# Patient Record
Sex: MALE | Race: WHITE | NOT HISPANIC OR LATINO | Employment: FULL TIME | URBAN - METROPOLITAN AREA
[De-identification: names, ages, dates, MRNs, and addresses within clinical notes are randomized per-mention and may not be internally consistent; named-entity substitution may affect disease eponyms.]

---

## 2017-07-18 ENCOUNTER — OFFICE VISIT - HEALTHEAST (OUTPATIENT)
Dept: FAMILY MEDICINE | Facility: CLINIC | Age: 37
End: 2017-07-18

## 2017-07-18 DIAGNOSIS — F41.1 GENERALIZED ANXIETY DISORDER: ICD-10-CM

## 2017-07-18 ASSESSMENT — MIFFLIN-ST. JEOR: SCORE: 1768.16

## 2017-07-25 ENCOUNTER — COMMUNICATION - HEALTHEAST (OUTPATIENT)
Dept: FAMILY MEDICINE | Facility: CLINIC | Age: 37
End: 2017-07-25

## 2017-08-31 ENCOUNTER — COMMUNICATION - HEALTHEAST (OUTPATIENT)
Dept: FAMILY MEDICINE | Facility: CLINIC | Age: 37
End: 2017-08-31

## 2017-12-07 ENCOUNTER — COMMUNICATION - HEALTHEAST (OUTPATIENT)
Dept: FAMILY MEDICINE | Facility: CLINIC | Age: 37
End: 2017-12-07

## 2018-06-11 ENCOUNTER — COMMUNICATION - HEALTHEAST (OUTPATIENT)
Dept: FAMILY MEDICINE | Facility: CLINIC | Age: 38
End: 2018-06-11

## 2018-06-11 DIAGNOSIS — F41.1 GENERALIZED ANXIETY DISORDER: ICD-10-CM

## 2018-06-14 ENCOUNTER — COMMUNICATION - HEALTHEAST (OUTPATIENT)
Dept: FAMILY MEDICINE | Facility: CLINIC | Age: 38
End: 2018-06-14

## 2018-06-18 ENCOUNTER — COMMUNICATION - HEALTHEAST (OUTPATIENT)
Dept: FAMILY MEDICINE | Facility: CLINIC | Age: 38
End: 2018-06-18

## 2018-06-18 DIAGNOSIS — F41.1 GENERALIZED ANXIETY DISORDER: ICD-10-CM

## 2018-09-09 ENCOUNTER — COMMUNICATION - HEALTHEAST (OUTPATIENT)
Dept: FAMILY MEDICINE | Facility: CLINIC | Age: 38
End: 2018-09-09

## 2018-09-10 ENCOUNTER — COMMUNICATION - HEALTHEAST (OUTPATIENT)
Dept: FAMILY MEDICINE | Facility: CLINIC | Age: 38
End: 2018-09-10

## 2018-09-10 DIAGNOSIS — F41.1 GENERALIZED ANXIETY DISORDER: ICD-10-CM

## 2018-09-26 ENCOUNTER — OFFICE VISIT - HEALTHEAST (OUTPATIENT)
Dept: FAMILY MEDICINE | Facility: CLINIC | Age: 38
End: 2018-09-26

## 2018-09-26 DIAGNOSIS — Z00.00 HEALTHCARE MAINTENANCE: ICD-10-CM

## 2018-09-26 DIAGNOSIS — F41.1 GENERALIZED ANXIETY DISORDER: ICD-10-CM

## 2018-09-26 ASSESSMENT — MIFFLIN-ST. JEOR: SCORE: 1870.78

## 2019-02-08 ENCOUNTER — COMMUNICATION - HEALTHEAST (OUTPATIENT)
Dept: FAMILY MEDICINE | Facility: CLINIC | Age: 39
End: 2019-02-08

## 2019-02-08 DIAGNOSIS — F41.1 GENERALIZED ANXIETY DISORDER: ICD-10-CM

## 2019-02-12 ENCOUNTER — COMMUNICATION - HEALTHEAST (OUTPATIENT)
Dept: FAMILY MEDICINE | Facility: CLINIC | Age: 39
End: 2019-02-12

## 2019-02-12 DIAGNOSIS — F41.1 GENERALIZED ANXIETY DISORDER: ICD-10-CM

## 2019-09-07 ENCOUNTER — HOSPITAL ENCOUNTER (EMERGENCY)
Facility: CLINIC | Age: 39
Discharge: HOME | End: 2019-09-07
Attending: EMERGENCY MEDICINE | Admitting: EMERGENCY MEDICINE
Payer: COMMERCIAL

## 2019-09-07 VITALS
TEMPERATURE: 97.8 F | OXYGEN SATURATION: 97 % | RESPIRATION RATE: 15 BRPM | DIASTOLIC BLOOD PRESSURE: 107 MMHG | SYSTOLIC BLOOD PRESSURE: 144 MMHG | HEART RATE: 83 BPM

## 2019-09-07 DIAGNOSIS — T63.443A BEE STING REACTION, ASSAULT, INITIAL ENCOUNTER: ICD-10-CM
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
## 2019-09-07 DIAGNOSIS — S90.112A CONTUSION OF LEFT GREAT TOE WITHOUT DAMAGE TO NAIL, INITIAL ENCOUNTER: ICD-10-CM

## 2019-09-07 PROCEDURE — 96375 TX/PRO/DX INJ NEW DRUG ADDON: CPT | Performed by: EMERGENCY MEDICINE

## 2019-09-07 PROCEDURE — 99285 EMERGENCY DEPT VISIT HI MDM: CPT | Mod: Z6 | Performed by: EMERGENCY MEDICINE

## 2019-09-07 PROCEDURE — 99285 EMERGENCY DEPT VISIT HI MDM: CPT | Mod: 25 | Performed by: EMERGENCY MEDICINE

## 2019-09-07 PROCEDURE — 25000131 ZZH RX MED GY IP 250 OP 636 PS 637: Performed by: EMERGENCY MEDICINE

## 2019-09-07 PROCEDURE — 96361 HYDRATE IV INFUSION ADD-ON: CPT | Performed by: EMERGENCY MEDICINE

## 2019-09-07 PROCEDURE — 96376 TX/PRO/DX INJ SAME DRUG ADON: CPT | Performed by: EMERGENCY MEDICINE

## 2019-09-07 PROCEDURE — 25000128 H RX IP 250 OP 636: Performed by: EMERGENCY MEDICINE

## 2019-09-07 PROCEDURE — 96374 THER/PROPH/DIAG INJ IV PUSH: CPT | Performed by: EMERGENCY MEDICINE

## 2019-09-07 RX ORDER — LORAZEPAM 2 MG/ML
0.5 INJECTION INTRAMUSCULAR ONCE
Status: COMPLETED | OUTPATIENT
Start: 2019-09-07 | End: 2019-09-07

## 2019-09-07 RX ORDER — SERTRALINE HYDROCHLORIDE 100 MG/1
200 TABLET, FILM COATED ORAL EVERY MORNING
COMMUNITY
Start: 2019-02-12 | End: 2023-06-14

## 2019-09-07 RX ORDER — PREDNISONE 20 MG/1
TABLET ORAL
Qty: 10 TABLET | Refills: 0 | Status: SHIPPED | OUTPATIENT
Start: 2019-09-07 | End: 2023-06-14

## 2019-09-07 RX ORDER — HYDROMORPHONE HYDROCHLORIDE 1 MG/ML
0.5 INJECTION, SOLUTION INTRAMUSCULAR; INTRAVENOUS; SUBCUTANEOUS ONCE
Status: COMPLETED | OUTPATIENT
Start: 2019-09-07 | End: 2019-09-07

## 2019-09-07 RX ORDER — DIPHENHYDRAMINE HYDROCHLORIDE 50 MG/ML
50 INJECTION INTRAMUSCULAR; INTRAVENOUS ONCE
Status: COMPLETED | OUTPATIENT
Start: 2019-09-07 | End: 2019-09-07

## 2019-09-07 RX ORDER — PREDNISONE 20 MG/1
60 TABLET ORAL ONCE
Status: COMPLETED | OUTPATIENT
Start: 2019-09-07 | End: 2019-09-07

## 2019-09-07 RX ORDER — KETOROLAC TROMETHAMINE 30 MG/ML
30 INJECTION, SOLUTION INTRAMUSCULAR; INTRAVENOUS ONCE
Status: COMPLETED | OUTPATIENT
Start: 2019-09-07 | End: 2019-09-07

## 2019-09-07 RX ORDER — OXYCODONE AND ACETAMINOPHEN 5; 325 MG/1; MG/1
1-2 TABLET ORAL EVERY 4 HOURS PRN
Qty: 4 TABLET | Refills: 0 | Status: SHIPPED | OUTPATIENT
Start: 2019-09-07 | End: 2023-06-14

## 2019-09-07 RX ADMIN — DIPHENHYDRAMINE HYDROCHLORIDE 50 MG: 50 INJECTION, SOLUTION INTRAMUSCULAR; INTRAVENOUS at 15:42

## 2019-09-07 RX ADMIN — HYDROMORPHONE HYDROCHLORIDE 0.5 MG: 1 INJECTION, SOLUTION INTRAMUSCULAR; INTRAVENOUS; SUBCUTANEOUS at 16:20

## 2019-09-07 RX ADMIN — KETOROLAC TROMETHAMINE 30 MG: 30 INJECTION, SOLUTION INTRAMUSCULAR at 15:42

## 2019-09-07 RX ADMIN — LORAZEPAM 0.5 MG: 2 INJECTION INTRAMUSCULAR; INTRAVENOUS at 15:54

## 2019-09-07 RX ADMIN — HYDROMORPHONE HYDROCHLORIDE 0.5 MG: 1 INJECTION, SOLUTION INTRAMUSCULAR; INTRAVENOUS; SUBCUTANEOUS at 17:52

## 2019-09-07 RX ADMIN — PREDNISONE 60 MG: 20 TABLET ORAL at 17:51

## 2019-09-07 RX ADMIN — SODIUM CHLORIDE 1000 ML: 9 INJECTION, SOLUTION INTRAVENOUS at 15:40

## 2019-09-07 NOTE — ED NOTES
Dilaudid given as pain is still intolerable, especially on the top of his head and his R foot. Pt now holding ice pack on head. Pt denies feeling of throat swelling or swallowing difficulties, is drinking water without problems.

## 2019-09-07 NOTE — ED AVS SNAPSHOT
[facility] Emergency Department  [address] [address]  Phone:  [phone]  Fax:  [phone]                                    Moody Davila   MRN: [id number]    Department:  [facility] Emergency Department   Date of Visit:  9/7/2019           After Visit Summary Signature Page    I have received my discharge instructions, and my questions have been answered. I have discussed any challenges I see with this plan with the nurse or doctor.    ..........................................................................................................................................  Patient/Patient Representative Signature      ..........................................................................................................................................  Patient Representative Print Name and Relationship to Patient    ..................................................               ................................................  Date                                   Time    ..........................................................................................................................................  Reviewed by Signature/Title    ...................................................              ..............................................  Date                                               Time          22EPIC Rev 08/18

## 2019-09-07 NOTE — ED NOTES
Pt was mowing the grass and apparently struck a nest of ground hornets - was stung by several and fell down trying to run from the insects. Patient denies any shortness of breath - c/o pain to feet, behind his knee and both of his hands. No swelling noted to face or lips - oral mucosa appears normal - both ears are redenned. MD in room
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
## 2019-09-08 NOTE — ED NOTES
Wounds dressed on foot - bacitracin and bandaid - tolerated well - denies any other needs or complaints at this time. Ready for discharge.

## 2019-09-08 NOTE — DISCHARGE INSTRUCTIONS
Return if symptoms worsen or new symptoms develop.  Follow-up with primary care later this week take prednisone as directed for the next 5 days take Benadryl 50 mg p.o. every 6 hours for the next 24 and then as needed.  If increased pain any swelling shortness of breath or other symptoms present please return for further evaluation and care.

## 2020-02-03 ENCOUNTER — COMMUNICATION - HEALTHEAST (OUTPATIENT)
Dept: FAMILY MEDICINE | Facility: CLINIC | Age: 40
End: 2020-02-03

## 2020-02-03 DIAGNOSIS — F41.1 GENERALIZED ANXIETY DISORDER: ICD-10-CM

## 2020-02-07 ENCOUNTER — OFFICE VISIT - HEALTHEAST (OUTPATIENT)
Dept: FAMILY MEDICINE | Facility: CLINIC | Age: 40
End: 2020-02-07

## 2020-02-07 DIAGNOSIS — F41.1 GENERALIZED ANXIETY DISORDER: ICD-10-CM

## 2020-02-07 DIAGNOSIS — Z28.21 INFLUENZA VACCINATION DECLINED: ICD-10-CM

## 2020-02-07 ASSESSMENT — MIFFLIN-ST. JEOR: SCORE: 1935.71

## 2020-02-07 ASSESSMENT — PATIENT HEALTH QUESTIONNAIRE - PHQ9: SUM OF ALL RESPONSES TO PHQ QUESTIONS 1-9: 3

## 2020-02-10 ASSESSMENT — ANXIETY QUESTIONNAIRES
3. WORRYING TOO MUCH ABOUT DIFFERENT THINGS: SEVERAL DAYS
7. FEELING AFRAID AS IF SOMETHING AWFUL MIGHT HAPPEN: SEVERAL DAYS
4. TROUBLE RELAXING: NOT AT ALL
GAD7 TOTAL SCORE: 4
2. NOT BEING ABLE TO STOP OR CONTROL WORRYING: NOT AT ALL
1. FEELING NERVOUS, ANXIOUS, OR ON EDGE: SEVERAL DAYS
5. BEING SO RESTLESS THAT IT IS HARD TO SIT STILL: NOT AT ALL
6. BECOMING EASILY ANNOYED OR IRRITABLE: SEVERAL DAYS

## 2020-03-11 ENCOUNTER — HEALTH MAINTENANCE LETTER (OUTPATIENT)
Age: 40
End: 2020-03-11

## 2021-01-03 ENCOUNTER — HEALTH MAINTENANCE LETTER (OUTPATIENT)
Age: 41
End: 2021-01-03

## 2021-01-15 ENCOUNTER — OFFICE VISIT - HEALTHEAST (OUTPATIENT)
Dept: FAMILY MEDICINE | Facility: CLINIC | Age: 41
End: 2021-01-15

## 2021-01-23 ENCOUNTER — HOSPITAL ENCOUNTER (EMERGENCY)
Facility: CLINIC | Age: 41
Discharge: HOME | End: 2021-01-23
Attending: NURSE PRACTITIONER | Admitting: NURSE PRACTITIONER
Payer: COMMERCIAL

## 2021-01-23 VITALS
HEART RATE: 105 BPM | SYSTOLIC BLOOD PRESSURE: 155 MMHG | TEMPERATURE: 99 F | DIASTOLIC BLOOD PRESSURE: 79 MMHG | OXYGEN SATURATION: 97 % | WEIGHT: 210 LBS | BODY MASS INDEX: 28.44 KG/M2 | RESPIRATION RATE: 18 BRPM | HEIGHT: 72 IN

## 2021-01-23 DIAGNOSIS — S01.511A LIP LACERATION: ICD-10-CM

## 2021-01-23 DIAGNOSIS — T74.91XA DOMESTIC ABUSE OF ADULT: ICD-10-CM

## 2021-01-23 PROCEDURE — 250N000013 HC RX MED GY IP 250 OP 250 PS 637: Performed by: NURSE PRACTITIONER

## 2021-01-23 PROCEDURE — 99284 EMERGENCY DEPT VISIT MOD MDM: CPT | Performed by: NURSE PRACTITIONER

## 2021-01-23 PROCEDURE — 99283 EMERGENCY DEPT VISIT LOW MDM: CPT | Performed by: NURSE PRACTITIONER

## 2021-01-23 RX ORDER — IBUPROFEN 400 MG/1
800 TABLET, FILM COATED ORAL ONCE
Status: COMPLETED | OUTPATIENT
Start: 2021-01-23 | End: 2021-01-23

## 2021-01-23 RX ADMIN — IBUPROFEN 800 MG: 400 TABLET, FILM COATED ORAL at 22:35

## 2021-01-23 ASSESSMENT — ENCOUNTER SYMPTOMS
NEUROLOGICAL NEGATIVE: 1
MUSCULOSKELETAL NEGATIVE: 1
RESPIRATORY NEGATIVE: 1
CARDIOVASCULAR NEGATIVE: 1
CONSTITUTIONAL NEGATIVE: 1
WOUND: 1

## 2021-01-23 ASSESSMENT — MIFFLIN-ST. JEOR: SCORE: 1905.55
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
## 2021-02-12 ENCOUNTER — COMMUNICATION - HEALTHEAST (OUTPATIENT)
Dept: FAMILY MEDICINE | Facility: CLINIC | Age: 41
End: 2021-02-12

## 2021-02-12 DIAGNOSIS — F41.1 GENERALIZED ANXIETY DISORDER: ICD-10-CM

## 2021-04-25 ENCOUNTER — HEALTH MAINTENANCE LETTER (OUTPATIENT)
Age: 41
End: 2021-04-25

## 2021-05-24 ENCOUNTER — COMMUNICATION - HEALTHEAST (OUTPATIENT)
Dept: FAMILY MEDICINE | Facility: CLINIC | Age: 41
End: 2021-05-24

## 2021-05-24 DIAGNOSIS — F41.1 GENERALIZED ANXIETY DISORDER: ICD-10-CM

## 2021-05-26 ASSESSMENT — PATIENT HEALTH QUESTIONNAIRE - PHQ9: SUM OF ALL RESPONSES TO PHQ QUESTIONS 1-9: 3

## 2021-05-28 ASSESSMENT — ANXIETY QUESTIONNAIRES: GAD7 TOTAL SCORE: 4

## 2021-05-31 VITALS — BODY MASS INDEX: 24.73 KG/M2 | WEIGHT: 182.56 LBS | HEIGHT: 72 IN

## 2021-06-01 ENCOUNTER — RECORDS - HEALTHEAST (OUTPATIENT)
Dept: ADMINISTRATIVE | Facility: CLINIC | Age: 41
End: 2021-06-01

## 2021-06-01 VITALS — HEIGHT: 72 IN | BODY MASS INDEX: 27.79 KG/M2 | WEIGHT: 205.19 LBS

## 2021-06-04 VITALS
HEIGHT: 72 IN | RESPIRATION RATE: 16 BRPM | BODY MASS INDEX: 29.73 KG/M2 | HEART RATE: 80 BPM | SYSTOLIC BLOOD PRESSURE: 110 MMHG | DIASTOLIC BLOOD PRESSURE: 80 MMHG | WEIGHT: 219.5 LBS

## 2021-06-05 NOTE — PROGRESS NOTES
"[facility] [email or web address]] Note    Name: Moody Davila  : 1980   MRN: [id number]    Moody Davila is a 39 y.o. male presenting to discuss the following:     CC:   Chief Complaint   Patient presents with     Medication Education Visit       HPI:  Here today for refill of Zoloft. Mood has been well controlled. No side effects from medication. Would like to continue.  PHQ9: 3/7  GAD7:     HEALTH MAINTENANCE:   - flu shot: declines today    ROS:   See HPI above.    PMH:   Patient Active Problem List   Diagnosis     Generalized Anxiety Disorder       No past medical history on file.    PSH:   Past Surgical History:   Procedure Laterality Date     WISDOM TOOTH EXTRACTION       MEDICATIONS:   Current Outpatient Medications on File Prior to Visit   Medication Sig Dispense Refill     sertraline (ZOLOFT) 100 MG tablet Take 2 tablets (200mg total) by mouth once daily. 180 tablet 0     No current facility-administered medications on file prior to visit.      ALLERGIES:  No Known Allergies    SOCIAL HISTORY:   Social History     Tobacco Use     Smoking status: Never Smoker     Smokeless tobacco: Never Used   Substance Use Topics     Alcohol use: Not on file     Drug use: Not on file     PHYSICAL EXAM:   /80   Pulse 80   Resp 16   Ht 5' 11.5\" (1.816 m)   Wt 219 lb 8 oz (99.6 kg)   BMI 30.19 kg/m     GENERAL: Moody is a pleasant, well appearing male in no acute distress.   HEART: Regular rate and rhythm, no murmurs.   LUNGS: Clear to auscultation bilaterally, unlabored.  PSYCH: Mood is good, normal affect, appropriately groomed.    ASSESSMENT & PLAN:   Moody Davila is a 39 y.o. male presenting today for medication refill.    1. Generalized anxiety disorder  - sertraline (ZOLOFT) 100 MG tablet; Take 2 tablets (200mg total) by mouth once daily.  Dispense: 180 tablet; Refill: 3     Symptoms well controlled. Would like to continue current dose. Refill sent to pharmacy.    2. Influenza vaccine " declined    RTC: 6 months - annual physical exam with PCP / sooner as needed    Alondra Flynn, DO

## 2021-06-05 NOTE — TELEPHONE ENCOUNTER
Refill NOT Given  Refill NOT Given> 15 months since last office visit  Refill given per Policy, patient informed they are overdue for Office Visit   OV 9/26/18    Daniela Hernandez, [facility] Triage/Med Refill 2/3/2020    Requested Prescriptions   Pending Prescriptions Disp Refills     sertraline (ZOLOFT) 100 MG tablet 180 tablet 1     Sig: Take 2 tablets (200mg total) by mouth once daily.       SSRI Refill Protocol  Failed - 2/3/2020 11:19 AM        Failed - PCP or prescribing provider visit in last year     Last office visit with prescriber/PCP: 9/26/2018 Jadyn Louis MD OR same dept: Visit date not found OR same specialty: 9/26/2018 Jadyn Louis MD  Last physical: Visit date not found Last MTM visit: Visit date not found   Next visit within 3 mo: Visit date not found  Next physical within 3 mo: Visit date not found  Prescriber OR PCP: Jadyn Luois MD  Last diagnosis associated with med order: 1. Generalized anxiety disorder  - sertraline (ZOLOFT) 100 MG tablet; Take 2 tablets (200mg total) by mouth once daily.  Dispense: 180 tablet; Refill: 1    If protocol passes may refill for 12 months if within 3 months of last provider visit (or a total of 15 months).

## 2021-06-12 NOTE — PROGRESS NOTES
Assessment/Plan:    Moody Davila is a 37 y.o. male presenting for:    1. Generalized anxiety disorder  He did not like how he felt while he was on citalopram to we will start him on Zoloft today as his brother is on that and is doing well.  He will take Klonopin as needed.  Discussed that this will likely make him drowsy and that he should use it very sparingly.  I did offer him a referral to therapy however he is not interested at this point. - sertraline (ZOLOFT) 50 MG tablet; Take 1 tablet (50 mg total) by mouth daily.  Dispense: 30 tablet; Refill: 2  - clonazePAM (KLONOPIN) 0.5 MG tablet; Take 1 tablet (0.5 mg total) by mouth 2 (two) times a day as needed for anxiety.  Dispense: 30 tablet; Refill: 0        There are no discontinued medications.    30 minutes spent with this patient over half of which spent in face-to-face counseling and coordination of care    Chief Complaint:  Chief Complaint   Patient presents with     Anxiety     Depression     Est. Care       Subjective:   Moody Davila  Is a very pleasant 37-year-old gentleman presenting to the clinic today for concerns over anxiety.  Patient is a past medical history significant for anxiety and was on citalopram at one point.  He states the medication did help his symptoms however he felt a bit groggy on it.  He has been dealing with things fairly well for the last few years but it on his wife's recommendation he is coming in today.  He feels a bit more irritable recently.  He is sleeping well.  He is hopeful to get back on a daily medication but prefers not to go back on the citalopram.    He believes that his twin brother is on Zoloft and is doing very well with that.  He feels as though he has a good support system.  No suicidal or homicidal ideations.      12 point review of systems completed and negative except for what has been described above    History   Smoking Status     Never Smoker   Smokeless Tobacco     Never Used       Current  "Outpatient Prescriptions   Medication Sig     clonazePAM (KLONOPIN) 0.5 MG tablet Take 1 tablet (0.5 mg total) by mouth 2 (two) times a day as needed for anxiety.     sertraline (ZOLOFT) 50 MG tablet Take 1 tablet (50 mg total) by mouth daily.         Objective:  Vitals:    07/18/17 1458   BP: 120/72   Pulse: 76   Resp: 16   Temp: 98.4  F (36.9  C)   TempSrc: Oral   Weight: 182 lb 9 oz (82.8 kg)   Height: 5' 11.5\" (1.816 m)       Vital signs reviewed and stable  General: No acute distress  Psych: Appropriate affect  HEENT: moist mucous membranes  Cardiovascular: regular rate and rhythm with no murmur  Pulmonary: clear to auscultation bilaterally with no wheeze  Abdomen: soft, non tender, non distended with normo-active bowel sounds  Extremities: warm and well perfused with no edema  Skin: warm and dry with no rash         This note has been dictated and transcribed using voice recognition software.   Any errors in transcription are unintentional and inherent to the software.  "

## 2021-06-15 NOTE — TELEPHONE ENCOUNTER
Refill Approved    Rx renewed per Medication Renewal Policy. Medication was last renewed on 2/7/20, last OV 2/7/20.    Kristal Garvin, [facility] Triage/Med Refill 2/13/2021     Requested Prescriptions   Pending Prescriptions Disp Refills     sertraline (ZOLOFT) 100 MG tablet 180 tablet 3     Sig: Take 2 tablets (200mg total) by mouth once daily.       SSRI Refill Protocol  Passed - 2/12/2021  1:35 PM        Passed - PCP or prescribing provider visit in last year     Last office visit with prescriber/PCP: 9/26/2018 Jadyn Louis MD OR same dept: Visit date not found OR same specialty: 2/7/2020 Alondra Flynn DO  Last physical: Visit date not found Last MTM visit: Visit date not found   Next visit within 3 mo: Visit date not found  Next physical within 3 mo: Visit date not found  Prescriber OR PCP: Jadyn Louis MD  Last diagnosis associated with med order: 1. Generalized anxiety disorder  - sertraline (ZOLOFT) 100 MG tablet; Take 2 tablets (200mg total) by mouth once daily.  Dispense: 180 tablet; Refill: 3    If protocol passes may refill for 12 months if within 3 months of last provider visit (or a total of 15 months).

## 2021-06-20 NOTE — PROGRESS NOTES
Assessment/Plan:    Moody Davila is a 38 y.o. male presenting for:    1. Generalized anxiety disorder  Patient is doing very well with the 150 mg of Zoloft daily.  We will continue the medication.  Next year he will schedule for complete physical and come fasting for some baseline lab work.  - sertraline (ZOLOFT) 100 MG tablet; TAKE 1 AND 1/2 TABLETS(150 MG) BY MOUTH DAILY  Dispense: 135 tablet; Refill: 3    2. Healthcare maintenance  - Influenza, Seasonal,Quad Inj, 36+ MOS  - Tdap vaccine,  6yo or older,  IM  The following high BMI interventions were performed this visit: encouragement to exercise and weight monitoring        Medications Discontinued During This Encounter   Medication Reason     sertraline (ZOLOFT) 100 MG tablet      sertraline (ZOLOFT) 100 MG tablet      sertraline (ZOLOFT) 100 MG tablet            Chief Complaint:  Chief Complaint   Patient presents with     Medication Education Visit     Med check       Subjective:   Moody Davila is a very pleasant 38-year-old gentleman with a past medical history significant for anxiety presenting to the clinic today to follow-up his anxiety.  Patient currently takes Zoloft 150 mg daily.  He feels like he is doing well with the medication.  He has not been on it now for about 1 year with some mild increases throughout that time.  He feels that he is on a steady dose.  He feels as though he is able to better function in his life with the medication.  He is not having any panic attacks.    He does admit that he is gained some weight recently and is planning on getting back to the gym.  He has stopped going to the gym for the last 6 months.  He is a 6-year-old and a 3-year-old at home.    Otherwise, his throat has been slightly sore recently.  His daughter has been a bit ill to.  He has not had any fevers.    12 point review of systems completed and negative except for what has been described above    History   Smoking Status     Never Smoker   Smokeless  "Tobacco     Never Used       Current Outpatient Prescriptions   Medication Sig     sertraline (ZOLOFT) 100 MG tablet TAKE 1 AND 1/2 TABLETS(150 MG) BY MOUTH DAILY         Objective:  Vitals:    09/26/18 1336   BP: 120/82   Pulse: 72   Resp: 16   Temp: 98  F (36.7  C)   TempSrc: Oral   Weight: 205 lb 3 oz (93.1 kg)   Height: 5' 11.5\" (1.816 m)       Vital signs reviewed and stable  General: No acute distress  Psych: Appropriate affect  HEENT: moist mucous membranes, pupils equal, round, reactive to light and accomodation, posterior oropharynx clear of erythema or exudate, tympanic membranes are pearly grey bilaterally  Lymph: no cervical or supraclavicular lymphadenopathy  Cardiovascular: regular rate and rhythm with no murmur  Pulmonary: clear to auscultation bilaterally with no wheeze  Abdomen: soft, non tender, non distended with normo-active bowel sounds  Extremities: warm and well perfused with no edema  Skin: warm and dry with no rash         This note has been dictated and transcribed using voice recognition software.   Any errors in transcription are unintentional and inherent to the software.  "

## 2021-06-23 NOTE — TELEPHONE ENCOUNTER
Dr. Louis-  See pt's OneWire message and reply via OneWire.  Pt currently taking:    sertraline (ZOLOFT) 100 MG tablet 135 tablet 3 9/26/2018     Sig: TAKE 1 AND 1/2 TABLETS(150 MG) BY MOUTH DAILY      Last OV 9-26-18.

## 2021-06-24 NOTE — TELEPHONE ENCOUNTER
Pharmacy requesting a new prescription for sertraline. Current rx has two sets of instructions, both 150mg and 200mg. Per Dodonation message from 2/8/19, pt increased to 200mg daily.  Rx queued for MD approval.

## 2021-06-24 NOTE — TELEPHONE ENCOUNTER
Medication Question or Clarification  Who is calling: Pharmacy: [facility]  What medication are you calling about?: Sertraline  What dose do you take?: 100 mg  How often are you taking the medication?: dosing unclear  Who prescribed the medication?: Jadyn Louis MD   What is your question/concern?: please update prescription with current dosing instruction. Prescription submitted in 2.11.2019 has two different sets of directions.  Pharmacy: [facility]  Okay to leave a detailed message?: Yes  Site CMT - Please call the pharmacy to obtain any additional needed information.    sertraline (ZOLOFT) 100 MG tablet 180 tablet 1 2/11/2019  No   Sig - Route: Take 2 tablets (200 mg total) by mouth daily. TAKE 1 AND 1/2 TABLETS(150 MG) BY MOUTH DAILY - Oral   Sent to pharmacy as: sertraline (ZOLOFT) 100 MG tablet   E-Prescribing Status: Receipt confirmed by pharmacy (2/11/2019  9:27 AM CST)

## 2021-06-25 NOTE — TELEPHONE ENCOUNTER
Refill Approved    Rx renewed per Medication Renewal Policy. Medication was last renewed on 2/13/21.    Juan Hinojosa, [facility] Triage/Med Refill 5/25/2021     Requested Prescriptions   Pending Prescriptions Disp Refills     sertraline (ZOLOFT) 100 MG tablet 180 tablet 0     Sig: Take 2 tablets (200mg total) by mouth once daily.       SSRI Refill Protocol  Passed - 5/24/2021  3:24 PM        Passed - PCP or prescribing provider visit in last year     Last office visit with prescriber/PCP: 9/26/2018 Jadyn Louis MD OR same dept: Visit date not found OR same specialty: 2/7/2020 Alondra Flynn DO  Last physical: Visit date not found Last MTM visit: Visit date not found   Next visit within 3 mo: Visit date not found  Next physical within 3 mo: Visit date not found  Prescriber OR PCP: Jadyn Louis MD  Last diagnosis associated with med order: 1. Generalized anxiety disorder  - sertraline (ZOLOFT) 100 MG tablet; Take 2 tablets (200mg total) by mouth once daily.  Dispense: 180 tablet; Refill: 0    If protocol passes may refill for 12 months if within 3 months of last provider visit (or a total of 15 months).

## 2021-06-28 NOTE — TELEPHONE ENCOUNTER
Requested Prescriptions     Pending Prescriptions Disp Refills     sertraline (ZOLOFT) 100 MG tablet 180 tablet 3     Sig: Take 2 tablets (200mg total) by mouth once daily.   Last Office Visit: 2/7/2020  Last Refill:  10/30/2020  CSA:  No  Date of Last Labs:  11/8/2011   Insomnia

## 2021-07-03 NOTE — ADDENDUM NOTE
Addendum Note by Jadyn Louis MD at 8/31/2017  2:55 PM     Author: Jadyn Louis MD Service: -- Author Type: Physician    Filed: 8/31/2017  2:55 PM Encounter Date: 8/31/2017 Status: Signed    : Jadyn Louis MD (Physician)    Addended by: JADYN LOUIS on: 8/31/2017 02:55 PM        Modules accepted: Orders

## 2021-07-03 NOTE — ADDENDUM NOTE
Addendum Note by Jadyn Louis MD at 12/8/2017  3:16 PM     Author: Jadyn Louis MD Service: -- Author Type: Physician    Filed: 12/8/2017  3:16 PM Encounter Date: 8/31/2017 Status: Signed    : Jadyn Louis MD (Physician)    Addended by: JADYN LOUIS on: 12/8/2017 03:16 PM        Modules accepted: Orders

## 2021-10-10 ENCOUNTER — HEALTH MAINTENANCE LETTER (OUTPATIENT)
Age: 41
End: 2021-10-10

## 2022-05-22 ENCOUNTER — HEALTH MAINTENANCE LETTER (OUTPATIENT)
Age: 42
End: 2022-05-22

## 2022-09-18 ENCOUNTER — HEALTH MAINTENANCE LETTER (OUTPATIENT)
Age: 42
End: 2022-09-18

## 2023-06-04 ENCOUNTER — HEALTH MAINTENANCE LETTER (OUTPATIENT)
Age: 43
End: 2023-06-04

## 2023-06-14 ENCOUNTER — OFFICE VISIT (OUTPATIENT)
Dept: FAMILY MEDICINE | Facility: CLINIC | Age: 43
End: 2023-06-14
Payer: COMMERCIAL

## 2023-06-14 VITALS
WEIGHT: 212 LBS | BODY MASS INDEX: 28.71 KG/M2 | HEIGHT: 72 IN | OXYGEN SATURATION: 98 % | SYSTOLIC BLOOD PRESSURE: 126 MMHG | RESPIRATION RATE: 16 BRPM | HEART RATE: 93 BPM | TEMPERATURE: 97.5 F | DIASTOLIC BLOOD PRESSURE: 72 MMHG

## 2023-06-14 DIAGNOSIS — F41.1 GAD (GENERALIZED ANXIETY DISORDER): ICD-10-CM

## 2023-06-14 DIAGNOSIS — Z00.00 ROUTINE GENERAL MEDICAL EXAMINATION AT A HEALTH CARE FACILITY: Primary | ICD-10-CM

## 2023-06-14 DIAGNOSIS — E78.00 ELEVATED CHOLESTEROL: ICD-10-CM

## 2023-06-14 PROCEDURE — 99396 PREV VISIT EST AGE 40-64: CPT | Performed by: NURSE PRACTITIONER

## 2023-06-14 PROCEDURE — 99214 OFFICE O/P EST MOD 30 MIN: CPT | Mod: 25 | Performed by: NURSE PRACTITIONER

## 2023-06-14 RX ORDER — SERTRALINE HYDROCHLORIDE 100 MG/1
200 TABLET, FILM COATED ORAL EVERY MORNING
Qty: 180 TABLET | Refills: 3 | Status: SHIPPED | OUTPATIENT
Start: 2023-06-14 | End: 2024-05-20

## 2023-06-14 ASSESSMENT — ANXIETY QUESTIONNAIRES
7. FEELING AFRAID AS IF SOMETHING AWFUL MIGHT HAPPEN: NOT AT ALL
GAD7 TOTAL SCORE: 4
8. IF YOU CHECKED OFF ANY PROBLEMS, HOW DIFFICULT HAVE THESE MADE IT FOR YOU TO DO YOUR WORK, TAKE CARE OF THINGS AT HOME, OR GET ALONG WITH OTHER PEOPLE?: NOT DIFFICULT AT ALL
5. BEING SO RESTLESS THAT IT IS HARD TO SIT STILL: NOT AT ALL
3. WORRYING TOO MUCH ABOUT DIFFERENT THINGS: SEVERAL DAYS
7. FEELING AFRAID AS IF SOMETHING AWFUL MIGHT HAPPEN: NOT AT ALL
1. FEELING NERVOUS, ANXIOUS, OR ON EDGE: SEVERAL DAYS
4. TROUBLE RELAXING: NOT AT ALL
GAD7 TOTAL SCORE: 4
6. BECOMING EASILY ANNOYED OR IRRITABLE: SEVERAL DAYS
GAD7 TOTAL SCORE: 4
IF YOU CHECKED OFF ANY PROBLEMS ON THIS QUESTIONNAIRE, HOW DIFFICULT HAVE THESE PROBLEMS MADE IT FOR YOU TO DO YOUR WORK, TAKE CARE OF THINGS AT HOME, OR GET ALONG WITH OTHER PEOPLE: NOT DIFFICULT AT ALL
2. NOT BEING ABLE TO STOP OR CONTROL WORRYING: SEVERAL DAYS

## 2023-06-14 ASSESSMENT — PATIENT HEALTH QUESTIONNAIRE - PHQ9
10. IF YOU CHECKED OFF ANY PROBLEMS, HOW DIFFICULT HAVE THESE PROBLEMS MADE IT FOR YOU TO DO YOUR WORK, TAKE CARE OF THINGS AT HOME, OR GET ALONG WITH OTHER PEOPLE: NOT DIFFICULT AT ALL
SUM OF ALL RESPONSES TO PHQ QUESTIONS 1-9: 2
SUM OF ALL RESPONSES TO PHQ QUESTIONS 1-9: 2

## 2023-06-14 ASSESSMENT — PAIN SCALES - GENERAL: PAINLEVEL: NO PAIN (0)

## 2023-06-14 NOTE — PROGRESS NOTES
SUBJECTIVE:   CC: Moody is an 43 year old who presents for preventative health visit.       6/14/2023     3:13 PM   Additional Questions   Roomed by SMA Trace     Healthy Habits:     Getting at least 3 servings of Calcium per day:  Yes    Bi-annual eye exam:  Yes    Dental care twice a year:  NO    Sleep apnea or symptoms of sleep apnea:  None    Diet:  Regular (no restrictions)    Frequency of exercise:  None    Duration of exercise:  N/A    Taking medications regularly:  Yes    Barriers to taking medications:  None    Medication side effects:  None    PHQ-2 Total Score: 1    Additional concerns today:  No  History of Present Illness       Mental Health Follow-up:  Patient presents to follow-up on Anxiety.    Patient's anxiety since last visit has been:  Good  The patient is not having other symptoms associated with anxiety.  Any significant life events: No  Patient is not feeling anxious or having panic attacks.  Patient has no concerns about alcohol or drug use.    He eats 0-1 servings of fruits and vegetables daily.He consumes 1 sweetened beverage(s) daily.He exercises with enough effort to increase his heart rate 9 or less minutes per day.  He exercises with enough effort to increase his heart rate 3 or less days per week.   He is not taking prescribed medications regularly due to None.    Today's PHQ-9         PHQ-9 Total Score: 2    PHQ-9 Q9 Thoughts of better off dead/self-harm past 2 weeks :   Not at all    How difficult have these problems made it for you to do your work, take care of things at home, or get along with other people: Not difficult at all  Today's ML-7 Score: 4          Today's PHQ-2 Score:       6/14/2023     3:06 PM   PHQ-2 ( 1999 [employer])   Q1: Little interest or pleasure in doing things 1   Q2: Feeling down, depressed or hopeless 0   PHQ-2 Score 1   Q1: Little interest or pleasure in doing things Several days   Q2: Feeling down, depressed or hopeless Not at all   PHQ-2 Score 1        Have you ever done Advance Care Planning? (For example, a Health Directive, POLST, or a discussion with a medical provider or your loved ones about your wishes): No, advance care planning information given to patient to review.  Patient declined advance care planning discussion at this time.    Social History     Tobacco Use     Smoking status: Never     Smokeless tobacco: Never   Vaping Use     Vaping status: Never Used     Passive vaping exposure: Yes   Substance Use Topics     Alcohol use: Not on file              View : No data to display.                Last PSA: No results found for: PSA    Reviewed orders with patient. Reviewed health maintenance and updated orders accordingly - Yes  BP Readings from Last 3 Encounters:   06/14/23 126/72   01/23/21 (!) 155/79   02/07/20 110/80    Wt Readings from Last 3 Encounters:   06/14/23 96.2 kg (212 lb)   01/23/21 95.3 kg (210 lb)   02/07/20 99.6 kg (219 lb 8 oz)                  Patient Active Problem List   Diagnosis     Generalized Anxiety Disorder     Past Surgical History:   Procedure Laterality Date     WISDOM TOOTH EXTRACTION         Social History     Tobacco Use     Smoking status: Never     Smokeless tobacco: Never   Substance Use Topics     Alcohol use: Not on file     Family History   Problem Relation Age of Onset     No Known Problems Mother      No Known Problems Father      Mental Illness Brother         anxiety         Current Outpatient Medications   Medication Sig Dispense Refill     sertraline (ZOLOFT) 100 MG tablet Take 2 tablets (200 mg) by mouth every morning 180 tablet 3     No Known Allergies    Reviewed and updated as needed this visit by clinical staff   Tobacco  Allergies  Meds              Reviewed and updated as needed this visit by Provider                 History reviewed. No pertinent past medical history.   Past Surgical History:   Procedure Laterality Date     WISDOM TOOTH EXTRACTION         Review of Systems  CONSTITUTIONAL:  NEGATIVE for fever, chills, change in weight  INTEGUMENTARY/SKIN: NEGATIVE for worrisome rashes, moles or lesions  EYES: NEGATIVE for vision changes or irritation  ENT: NEGATIVE for ear, mouth and throat problems  RESP: NEGATIVE for significant cough or SOB  CV: NEGATIVE for chest pain, palpitations or peripheral edema  GI: NEGATIVE for nausea, abdominal pain, heartburn, or change in bowel habits   male: negative for dysuria, hematuria, decreased urinary stream, erectile dysfunction, urethral discharge  MUSCULOSKELETAL: NEGATIVE for significant arthralgias or myalgia  NEURO: NEGATIVE for weakness, dizziness or paresthesias  PSYCHIATRIC: ++ for anxiety     OBJECTIVE:   /72 (BP Location: Right arm, Patient Position: Sitting, Cuff Size: Adult Large)   Pulse 93   Temp 97.5  F (36.4  C) (Tympanic)   Resp 16   Ht 1.829 m (6')   Wt 96.2 kg (212 lb)   SpO2 98%   BMI 28.75 kg/m      Physical Exam  GENERAL: healthy, alert and no distress  EYES: Eyes grossly normal to inspection, PERRL and conjunctivae and sclerae normal  HENT: ear canals and TM's normal, nose and mouth without ulcers or lesions  NECK: no adenopathy, no asymmetry, masses, or scars and thyroid normal to palpation  RESP: lungs clear to auscultation - no rales, rhonchi or wheezes  CV: regular rate and rhythm, normal S1 S2, no S3 or S4, no murmur, click or rub, no peripheral edema and peripheral pulses strong  ABDOMEN: soft, nontender, no hepatosplenomegaly, no masses and bowel sounds normal  MS: no gross musculoskeletal defects noted, no edema  SKIN: no suspicious lesions or rashes  NEURO: Normal strength and tone, mentation intact and speech normal  PSYCH: mentation appears normal, affect normal/bright    Diagnostic Test Results:  Labs reviewed in Epic      ASSESSMENT/PLAN:   Moody was seen today for physical.    Diagnoses and all orders for this visit:    Routine general medical examination at a health care facility    ML (generalized anxiety  disorder)  Stable continue current meds  Labs for monitor medication order placed to RTC fasting  -     sertraline (ZOLOFT) 100 MG tablet; Take 2 tablets (200 mg) by mouth every morning  -     CBC with platelets and differential; Future  -     Comprehensive metabolic panel (BMP + Alb, Alk Phos, ALT, AST, Total. Bili, TP); Future  -     TSH with free T4 reflex; Future    Elevated cholesterol  Low fat diet and daily activity   Come in for fasting labs to monitor.   -     Lipid panel reflex to direct LDL Non-fasting; Future  -     CBC with platelets and differential; Future  -     Comprehensive metabolic panel (BMP + Alb, Alk Phos, ALT, AST, Total. Bili, TP); Future    Other orders  -     REVIEW OF HEALTH MAINTENANCE PROTOCOL ORDERS        Patient has been advised of split billing requirements and indicates understanding: Yes      COUNSELING:   Reviewed preventive health counseling, as reflected in patient instructions      BMI:   Estimated body mass index is 28.75 kg/m  as calculated from the following:    Height as of this encounter: 1.829 m (6').    Weight as of this encounter: 96.2 kg (212 lb).   Weight management plan: Discussed healthy diet and exercise guidelines      He reports that he has never smoked. He has never used smokeless tobacco.    Call or return to the clinic with any worsening of symptoms or no resolution. Patient/Parent verbalized understanding and is in agreement. Medication side effects reviewed.   Current Outpatient Medications   Medication Sig Dispense Refill     sertraline (ZOLOFT) 100 MG tablet Take 2 tablets (200 mg) by mouth every morning 180 tablet 3     Chart documentation with Dragon Voice recognition Software. Although reviewed after completion, some words and grammatical errors may remain.      BERNICE Tsai [facility]

## 2024-05-20 ENCOUNTER — OFFICE VISIT (OUTPATIENT)
Dept: FAMILY MEDICINE | Facility: CLINIC | Age: 44
End: 2024-05-20
Payer: COMMERCIAL

## 2024-05-20 VITALS
SYSTOLIC BLOOD PRESSURE: 120 MMHG | DIASTOLIC BLOOD PRESSURE: 80 MMHG | TEMPERATURE: 97.6 F | WEIGHT: 217 LBS | OXYGEN SATURATION: 98 % | BODY MASS INDEX: 29.39 KG/M2 | RESPIRATION RATE: 20 BRPM | HEIGHT: 72 IN | HEART RATE: 71 BPM

## 2024-05-20 DIAGNOSIS — F41.1 GAD (GENERALIZED ANXIETY DISORDER): ICD-10-CM

## 2024-05-20 PROCEDURE — 99213 OFFICE O/P EST LOW 20 MIN: CPT | Performed by: FAMILY MEDICINE

## 2024-05-20 RX ORDER — SERTRALINE HYDROCHLORIDE 100 MG/1
200 TABLET, FILM COATED ORAL EVERY MORNING
Qty: 180 TABLET | Refills: 3 | Status: SHIPPED | OUTPATIENT
Start: 2024-05-20

## 2024-05-20 ASSESSMENT — ANXIETY QUESTIONNAIRES
GAD7 TOTAL SCORE: 1
IF YOU CHECKED OFF ANY PROBLEMS ON THIS QUESTIONNAIRE, HOW DIFFICULT HAVE THESE PROBLEMS MADE IT FOR YOU TO DO YOUR WORK, TAKE CARE OF THINGS AT HOME, OR GET ALONG WITH OTHER PEOPLE: NOT DIFFICULT AT ALL
6. BECOMING EASILY ANNOYED OR IRRITABLE: NOT AT ALL
5. BEING SO RESTLESS THAT IT IS HARD TO SIT STILL: NOT AT ALL
3. WORRYING TOO MUCH ABOUT DIFFERENT THINGS: SEVERAL DAYS
7. FEELING AFRAID AS IF SOMETHING AWFUL MIGHT HAPPEN: NOT AT ALL
8. IF YOU CHECKED OFF ANY PROBLEMS, HOW DIFFICULT HAVE THESE MADE IT FOR YOU TO DO YOUR WORK, TAKE CARE OF THINGS AT HOME, OR GET ALONG WITH OTHER PEOPLE?: NOT DIFFICULT AT ALL
4. TROUBLE RELAXING: NOT AT ALL
GAD7 TOTAL SCORE: 1
7. FEELING AFRAID AS IF SOMETHING AWFUL MIGHT HAPPEN: NOT AT ALL
GAD7 TOTAL SCORE: 1
1. FEELING NERVOUS, ANXIOUS, OR ON EDGE: NOT AT ALL
2. NOT BEING ABLE TO STOP OR CONTROL WORRYING: NOT AT ALL

## 2024-05-20 ASSESSMENT — PAIN SCALES - GENERAL: PAINLEVEL: NO PAIN (0)

## 2024-05-20 NOTE — PROGRESS NOTES
Assessment & Plan     ML (generalized anxiety disorder)  Stable, no side effects, no other concerns  Would like to stay on current regimen, has tried cutting down before and felt anxiety returned  - sertraline (ZOLOFT) 100 MG tablet; Take 2 tablets (200 mg) by mouth every morning          BMI  Estimated body mass index is 29.43 kg/m  as calculated from the following:    Height as of this encounter: 1.829 m (6').    Weight as of this encounter: 98.4 kg (217 lb).             Carmenza Uriostegui is a 44 year old, presenting for the following health issues:  Anxiety        5/20/2024     4:14 PM   Additional Questions   Roomed by Inna ACEKRMAN CMA   Accompanied by Self     Anxiety   Zoloft 200mg every day    History of Present Illness       Mental Health Follow-up:  Patient presents to follow-up on Depression & Anxiety.Patient's depression since last visit has been:  No change  The patient is not having other symptoms associated with depression.  Patient's anxiety since last visit has been:  No change  The patient is not having other symptoms associated with anxiety.  Any significant life events: No  Patient is not feeling anxious or having panic attacks.  Patient has no concerns about alcohol or drug use.    He eats 0-1 servings of fruits and vegetables daily.He consumes 2 sweetened beverage(s) daily.He exercises with enough effort to increase his heart rate 30 to 60 minutes per day.  He exercises with enough effort to increase his heart rate 5 days per week.   He is taking medications regularly.       Social History     Tobacco Use    Smoking status: Never    Smokeless tobacco: Never   Vaping Use    Vaping status: Never Used         2/10/2020     8:00 AM 6/14/2023     3:06 PM 5/20/2024     4:14 PM   ML-7 SCORE   Total Score  4 (minimal anxiety) 1 (minimal anxiety)   Total Score 4 4 1         2/7/2020     4:16 PM 6/14/2023     3:05 PM   PHQ   PHQ-9 Total Score 3 2   Q9: Thoughts of better off dead/self-harm past 2 weeks  Not at all Not at all               Objective    /80   Pulse 71   Temp 97.6  F (36.4  C) (Tympanic)   Resp 20   Ht 1.829 m (6')   Wt 98.4 kg (217 lb)   SpO2 98%   BMI 29.43 kg/m    Body mass index is 29.43 kg/m .  Physical Exam  Vitals reviewed.   Cardiovascular:      Rate and Rhythm: Normal rate.   Pulmonary:      Effort: Pulmonary effort is normal.   Neurological:      Mental Status: He is alert.                    Signed Electronically by: Jamila Ramírez MD     REDOSE  Pain:  V/E  Time: 1200  Medication: Lidocaine 1.5%  Given in increments after aspiration  VSS analgesia at T10        Vital signs stable  No anesthesia complications from labor epidural  Patient discharged to OB post partum care as per policy

## 2024-07-14 ENCOUNTER — HEALTH MAINTENANCE LETTER (OUTPATIENT)
Age: 44
End: 2024-07-14

## 2025-06-23 ENCOUNTER — VIRTUAL VISIT (OUTPATIENT)
Dept: FAMILY MEDICINE | Facility: CLINIC | Age: 45
End: 2025-06-23
Payer: COMMERCIAL

## 2025-06-23 DIAGNOSIS — F41.1 GAD (GENERALIZED ANXIETY DISORDER): ICD-10-CM

## 2025-06-23 PROCEDURE — 98013 SYNCH AUDIO-ONLY EST LOW 20: CPT | Performed by: FAMILY MEDICINE

## 2025-06-23 RX ORDER — SERTRALINE HYDROCHLORIDE 100 MG/1
200 TABLET, FILM COATED ORAL EVERY MORNING
Qty: 180 TABLET | Refills: 3 | Status: SHIPPED | OUTPATIENT
Start: 2025-06-23

## 2025-06-23 ASSESSMENT — ANXIETY QUESTIONNAIRES
GAD7 TOTAL SCORE: 1
2. NOT BEING ABLE TO STOP OR CONTROL WORRYING: NOT AT ALL
3. WORRYING TOO MUCH ABOUT DIFFERENT THINGS: SEVERAL DAYS
5. BEING SO RESTLESS THAT IT IS HARD TO SIT STILL: NOT AT ALL
GAD7 TOTAL SCORE: 1
1. FEELING NERVOUS, ANXIOUS, OR ON EDGE: NOT AT ALL
6. BECOMING EASILY ANNOYED OR IRRITABLE: NOT AT ALL
7. FEELING AFRAID AS IF SOMETHING AWFUL MIGHT HAPPEN: NOT AT ALL

## 2025-06-23 ASSESSMENT — PATIENT HEALTH QUESTIONNAIRE - PHQ9: 5. POOR APPETITE OR OVEREATING: NOT AT ALL

## 2025-06-23 ASSESSMENT — ENCOUNTER SYMPTOMS: NERVOUS/ANXIOUS: 1

## 2025-06-23 NOTE — PROGRESS NOTES
Moody is a 45 year old who is being evaluated via a billable telephone visit.    What phone number would you like to be contacted at?   How would you like to obtain your AVS? To  Originating Location (pt. Location): Home    Distant Location (provider location):  On-site  Telephone visit completed due to the video connection was lost and majority of visit was completed via audio-only.    Assessment & Plan     ML (generalized anxiety disorder)  Stable  No concerns  Continue current management  - sertraline (ZOLOFT) 100 MG tablet; Take 2 tablets (200 mg) by mouth every morning.                Subjective   Moody is a 45 year old, presenting for the following health issues:  Anxiety        6/23/2025     3:15 PM   Additional Questions   Roomed by Irena WILLS   Accompanied by self     Anxiety    History of Present Illness       Reason for visit:  Anxiety   He is taking medications regularly.        Anxiety   How are you doing with your anxiety since your last visit? No change  Are you having other symptoms that might be associated with anxiety? No  Have you had a significant life event? No   Are you feeling depressed? No  Do you have any concerns with your use of alcohol or other drugs? No    Social History     Tobacco Use    Smoking status: Never    Smokeless tobacco: Never   Vaping Use    Vaping status: Never Used         6/14/2023     3:06 PM 5/20/2024     4:14 PM 6/23/2025     2:55 PM   ML-7 SCORE   Total Score 4 (minimal anxiety) 1 (minimal anxiety)    Total Score 4 1 1         2/7/2020     4:16 PM 6/14/2023     3:05 PM   PHQ   PHQ-9 Total Score 3 2   Q9: Thoughts of better off dead/self-harm past 2 weeks Not at all Not at all        Proxy-reported         6/23/2025     2:55 PM   ML-7    1. Feeling nervous, anxious, or on edge 0   2. Not being able to stop or control worrying 0   3. Worrying too much about different things 1   4. Trouble relaxing 0   5. Being so restless that it is hard to sit still 0   6.  Becoming easily annoyed or irritable 0   7. Feeling afraid, as if something awful might happen 0   ML-7 Total Score 1     How many days per week do you miss taking your medication?             Objective           Vitals:  No vitals were obtained today due to virtual visit.    Physical Exam   General: Alert and no distress //Respiratory: No audible wheeze, cough, or shortness of breath // Psychiatric:  Appropriate affect, tone, and pace of words            Phone call duration: 10 minutes  Signed Electronically by: Jamila Ramírez MD

## 2025-06-23 NOTE — PROGRESS NOTES
[facility]    Answers submitted by the patient for this visit:  General Questionnaire (Submitted on 6/23/2025)  Chief Complaint: Chronic problems general questions HPI Form  What is the reason for your visit today? : anxiety  How many days per week do you miss taking your medication?: 0  Questionnaire about: Chronic problems general questions HPI Form (Submitted on 6/23/2025)  Chief Complaint: Chronic problems general questions HPI Form

## 2025-06-23 NOTE — NURSING NOTE
Chief Complaint   Patient presents with    Anxiety       Initial There were no vitals taken for this visit. Estimated body mass index is 29.43 kg/m  as calculated from the following:    Height as of 5/20/24: 1.829 m (6').    Weight as of 5/20/24: 98.4 kg (217 lb).    Patient presents to the clinic using No DME    Is there anyone who you would like to be able to receive your results? No  If yes have patient fill out DANDY

## 2025-07-19 ENCOUNTER — HEALTH MAINTENANCE LETTER (OUTPATIENT)
Age: 45
End: 2025-07-19

## 2025-07-26 ENCOUNTER — APPOINTMENT (OUTPATIENT)
Dept: CT IMAGING | Facility: CLINIC | Age: 45
End: 2025-07-26
Attending: EMERGENCY MEDICINE
Payer: COMMERCIAL

## 2025-07-26 ENCOUNTER — HOSPITAL ENCOUNTER (EMERGENCY)
Facility: CLINIC | Age: 45
Discharge: HOME | End: 2025-07-26
Attending: EMERGENCY MEDICINE
Payer: COMMERCIAL

## 2025-07-26 ENCOUNTER — ANESTHESIA EVENT (OUTPATIENT)
Dept: SURGERY | Facility: CLINIC | Age: 45
End: 2025-07-26
Payer: COMMERCIAL

## 2025-07-26 ENCOUNTER — ANESTHESIA (OUTPATIENT)
Dept: SURGERY | Facility: CLINIC | Age: 45
End: 2025-07-26
Payer: COMMERCIAL

## 2025-07-26 VITALS
SYSTOLIC BLOOD PRESSURE: 120 MMHG | WEIGHT: 210 LBS | HEART RATE: 70 BPM | OXYGEN SATURATION: 94 % | DIASTOLIC BLOOD PRESSURE: 77 MMHG | RESPIRATION RATE: 16 BRPM | BODY MASS INDEX: 28.48 KG/M2 | TEMPERATURE: 98 F

## 2025-07-26 DIAGNOSIS — K35.30 ACUTE APPENDICITIS WITH LOCALIZED PERITONITIS, WITHOUT PERFORATION, ABSCESS, OR GANGRENE: Primary | ICD-10-CM

## 2025-07-26 DIAGNOSIS — G89.18 POST-OP PAIN: ICD-10-CM

## 2025-07-26 DIAGNOSIS — K76.9 LIVER LESION: ICD-10-CM

## 2025-07-26 LAB
ALBUMIN SERPL BCG-MCNC: 4.5 G/DL (ref 3.5–5.2)
ALP SERPL-CCNC: 77 U/L (ref 40–150)
ALT SERPL W P-5'-P-CCNC: 20 U/L (ref 0–70)
ANION GAP SERPL CALCULATED.3IONS-SCNC: 12 MMOL/L (ref 7–15)
AST SERPL W P-5'-P-CCNC: 15 U/L (ref 0–45)
BASOPHILS # BLD AUTO: 0 10E3/UL (ref 0–0.2)
BASOPHILS NFR BLD AUTO: 0 %
BILIRUB SERPL-MCNC: 0.6 MG/DL
BUN SERPL-MCNC: 9.3 MG/DL (ref 6–20)
CALCIUM SERPL-MCNC: 9.2 MG/DL (ref 8.8–10.4)
CHLORIDE SERPL-SCNC: 104 MMOL/L (ref 98–107)
CREAT SERPL-MCNC: 0.94 MG/DL (ref 0.67–1.17)
EGFRCR SERPLBLD CKD-EPI 2021: >90 ML/MIN/1.73M2
EOSINOPHIL # BLD AUTO: 0.1 10E3/UL (ref 0–0.7)
EOSINOPHIL NFR BLD AUTO: 1 %
ERYTHROCYTE [DISTWIDTH] IN BLOOD BY AUTOMATED COUNT: 12.3 % (ref 10–15)
GLUCOSE SERPL-MCNC: 106 MG/DL (ref 70–99)
HCO3 SERPL-SCNC: 22 MMOL/L (ref 22–29)
HCT VFR BLD AUTO: 44.3 % (ref 40–53)
HGB BLD-MCNC: 15.7 G/DL (ref 13.3–17.7)
IMM GRANULOCYTES # BLD: 0 10E3/UL
IMM GRANULOCYTES NFR BLD: 0 %
LYMPHOCYTES # BLD AUTO: 0.9 10E3/UL (ref 0.8–5.3)
LYMPHOCYTES NFR BLD AUTO: 11 %
MCH RBC QN AUTO: 32.6 PG (ref 26.5–33)
MCHC RBC AUTO-ENTMCNC: 35.4 G/DL (ref 31.5–36.5)
MCV RBC AUTO: 92 FL (ref 78–100)
MONOCYTES # BLD AUTO: 0.6 10E3/UL (ref 0–1.3)
MONOCYTES NFR BLD AUTO: 7 %
NEUTROPHILS # BLD AUTO: 6.5 10E3/UL (ref 1.6–8.3)
NEUTROPHILS NFR BLD AUTO: 81 %
NRBC # BLD AUTO: 0 10E3/UL
NRBC BLD AUTO-RTO: 0 /100
PLATELET # BLD AUTO: 166 10E3/UL (ref 150–450)
POTASSIUM SERPL-SCNC: 3.7 MMOL/L (ref 3.4–5.3)
PROT SERPL-MCNC: 7.4 G/DL (ref 6.4–8.3)
RBC # BLD AUTO: 4.82 10E6/UL (ref 4.4–5.9)
SODIUM SERPL-SCNC: 138 MMOL/L (ref 135–145)
WBC # BLD AUTO: 8.1 10E3/UL (ref 4–11)

## 2025-07-26 PROCEDURE — 99285 EMERGENCY DEPT VISIT HI MDM: CPT | Mod: 25 | Performed by: EMERGENCY MEDICINE

## 2025-07-26 PROCEDURE — 99204 OFFICE O/P NEW MOD 45 MIN: CPT | Mod: 57 | Performed by: SPECIALIST

## 2025-07-26 PROCEDURE — 74177 CT ABD & PELVIS W/CONTRAST: CPT

## 2025-07-26 PROCEDURE — 250N000011 HC RX IP 250 OP 636: Performed by: EMERGENCY MEDICINE

## 2025-07-26 PROCEDURE — 272N000001 HC OR GENERAL SUPPLY STERILE: Performed by: SPECIALIST

## 2025-07-26 PROCEDURE — 85025 COMPLETE CBC W/AUTO DIFF WBC: CPT | Performed by: EMERGENCY MEDICINE

## 2025-07-26 PROCEDURE — 96376 TX/PRO/DX INJ SAME DRUG ADON: CPT

## 2025-07-26 PROCEDURE — 82247 BILIRUBIN TOTAL: CPT | Performed by: EMERGENCY MEDICINE

## 2025-07-26 PROCEDURE — 360N000076 HC SURGERY LEVEL 3, PER MIN: Performed by: SPECIALIST

## 2025-07-26 PROCEDURE — 96361 HYDRATE IV INFUSION ADD-ON: CPT

## 2025-07-26 PROCEDURE — 82310 ASSAY OF CALCIUM: CPT | Performed by: EMERGENCY MEDICINE

## 2025-07-26 PROCEDURE — 88304 TISSUE EXAM BY PATHOLOGIST: CPT | Mod: TC | Performed by: SPECIALIST

## 2025-07-26 PROCEDURE — 44970 LAPAROSCOPY APPENDECTOMY: CPT | Performed by: SPECIALIST

## 2025-07-26 PROCEDURE — 370N000017 HC ANESTHESIA TECHNICAL FEE, PER MIN: Performed by: SPECIALIST

## 2025-07-26 PROCEDURE — 250N000009 HC RX 250: Performed by: SPECIALIST

## 2025-07-26 PROCEDURE — 96375 TX/PRO/DX INJ NEW DRUG ADDON: CPT

## 2025-07-26 PROCEDURE — 99285 EMERGENCY DEPT VISIT HI MDM: CPT | Performed by: EMERGENCY MEDICINE

## 2025-07-26 PROCEDURE — 250N000009 HC RX 250: Performed by: NURSE ANESTHETIST, CERTIFIED REGISTERED

## 2025-07-26 PROCEDURE — 710N000010 HC RECOVERY PHASE 1, LEVEL 2, PER MIN: Performed by: SPECIALIST

## 2025-07-26 PROCEDURE — 258N000003 HC RX IP 258 OP 636: Performed by: EMERGENCY MEDICINE

## 2025-07-26 PROCEDURE — 96374 THER/PROPH/DIAG INJ IV PUSH: CPT | Mod: 59

## 2025-07-26 PROCEDURE — 271N000001 HC OR GENERAL SUPPLY NON-STERILE: Performed by: SPECIALIST

## 2025-07-26 PROCEDURE — 250N000009 HC RX 250: Performed by: EMERGENCY MEDICINE

## 2025-07-26 PROCEDURE — 250N000025 HC SEVOFLURANE, PER MIN: Performed by: SPECIALIST

## 2025-07-26 PROCEDURE — 250N000011 HC RX IP 250 OP 636: Performed by: NURSE ANESTHETIST, CERTIFIED REGISTERED

## 2025-07-26 PROCEDURE — 710N000012 HC RECOVERY PHASE 2, PER MINUTE: Performed by: SPECIALIST

## 2025-07-26 PROCEDURE — 258N000003 HC RX IP 258 OP 636: Performed by: NURSE ANESTHETIST, CERTIFIED REGISTERED

## 2025-07-26 PROCEDURE — 36415 COLL VENOUS BLD VENIPUNCTURE: CPT | Performed by: EMERGENCY MEDICINE

## 2025-07-26 RX ORDER — PROPOFOL 10 MG/ML
INJECTION, EMULSION INTRAVENOUS CONTINUOUS PRN
Status: DISCONTINUED | OUTPATIENT
Start: 2025-07-26 | End: 2025-07-26

## 2025-07-26 RX ORDER — GLYCOPYRROLATE 0.2 MG/ML
INJECTION, SOLUTION INTRAMUSCULAR; INTRAVENOUS PRN
Status: DISCONTINUED | OUTPATIENT
Start: 2025-07-26 | End: 2025-07-26

## 2025-07-26 RX ORDER — OXYCODONE AND ACETAMINOPHEN 5; 325 MG/1; MG/1
2 TABLET ORAL
Status: DISCONTINUED | OUTPATIENT
Start: 2025-07-26 | End: 2025-07-26 | Stop reason: HOSPADM

## 2025-07-26 RX ORDER — PROPOFOL 10 MG/ML
INJECTION, EMULSION INTRAVENOUS PRN
Status: DISCONTINUED | OUTPATIENT
Start: 2025-07-26 | End: 2025-07-26

## 2025-07-26 RX ORDER — NALOXONE HYDROCHLORIDE 0.4 MG/ML
0.1 INJECTION, SOLUTION INTRAMUSCULAR; INTRAVENOUS; SUBCUTANEOUS
Status: DISCONTINUED | OUTPATIENT
Start: 2025-07-26 | End: 2025-07-26 | Stop reason: HOSPADM

## 2025-07-26 RX ORDER — OXYCODONE HYDROCHLORIDE 5 MG/1
5 TABLET ORAL EVERY 6 HOURS PRN
Qty: 12 TABLET | Refills: 0 | Status: SHIPPED | OUTPATIENT
Start: 2025-07-26

## 2025-07-26 RX ORDER — BUPIVACAINE HYDROCHLORIDE AND EPINEPHRINE 2.5; 5 MG/ML; UG/ML
INJECTION, SOLUTION EPIDURAL; INFILTRATION; INTRACAUDAL; PERINEURAL PRN
Status: DISCONTINUED | OUTPATIENT
Start: 2025-07-26 | End: 2025-07-26 | Stop reason: HOSPADM

## 2025-07-26 RX ORDER — CEFAZOLIN SODIUM/WATER 2 G/20 ML
2 SYRINGE (ML) INTRAVENOUS SEE ADMIN INSTRUCTIONS
Status: DISCONTINUED | OUTPATIENT
Start: 2025-07-26 | End: 2025-07-26 | Stop reason: HOSPADM

## 2025-07-26 RX ORDER — AMPICILLIN AND SULBACTAM 2; 1 G/1; G/1
3 INJECTION, POWDER, FOR SOLUTION INTRAMUSCULAR; INTRAVENOUS ONCE
Status: COMPLETED | OUTPATIENT
Start: 2025-07-26 | End: 2025-07-26

## 2025-07-26 RX ORDER — FENTANYL CITRATE 50 UG/ML
INJECTION, SOLUTION INTRAMUSCULAR; INTRAVENOUS PRN
Status: DISCONTINUED | OUTPATIENT
Start: 2025-07-26 | End: 2025-07-26

## 2025-07-26 RX ORDER — DEXAMETHASONE SODIUM PHOSPHATE 4 MG/ML
INJECTION, SOLUTION INTRA-ARTICULAR; INTRALESIONAL; INTRAMUSCULAR; INTRAVENOUS; SOFT TISSUE PRN
Status: DISCONTINUED | OUTPATIENT
Start: 2025-07-26 | End: 2025-07-26

## 2025-07-26 RX ORDER — HYDROMORPHONE HCL IN WATER/PF 6 MG/30 ML
0.4 PATIENT CONTROLLED ANALGESIA SYRINGE INTRAVENOUS EVERY 5 MIN PRN
Status: DISCONTINUED | OUTPATIENT
Start: 2025-07-26 | End: 2025-07-26 | Stop reason: HOSPADM

## 2025-07-26 RX ORDER — KETOROLAC TROMETHAMINE 30 MG/ML
INJECTION, SOLUTION INTRAMUSCULAR; INTRAVENOUS PRN
Status: DISCONTINUED | OUTPATIENT
Start: 2025-07-26 | End: 2025-07-26

## 2025-07-26 RX ORDER — LIDOCAINE HYDROCHLORIDE 20 MG/ML
INJECTION, SOLUTION INFILTRATION; PERINEURAL PRN
Status: DISCONTINUED | OUTPATIENT
Start: 2025-07-26 | End: 2025-07-26

## 2025-07-26 RX ORDER — HYDROMORPHONE HYDROCHLORIDE 1 MG/ML
0.5 INJECTION, SOLUTION INTRAMUSCULAR; INTRAVENOUS; SUBCUTANEOUS ONCE
Refills: 0 | Status: COMPLETED | OUTPATIENT
Start: 2025-07-26 | End: 2025-07-26

## 2025-07-26 RX ORDER — OXYCODONE HYDROCHLORIDE 5 MG/1
5-10 TABLET ORAL EVERY 6 HOURS PRN
Qty: 10 TABLET | Refills: 0 | Status: SHIPPED | OUTPATIENT
Start: 2025-07-26

## 2025-07-26 RX ORDER — ONDANSETRON 2 MG/ML
4 INJECTION INTRAMUSCULAR; INTRAVENOUS ONCE
Status: COMPLETED | OUTPATIENT
Start: 2025-07-26 | End: 2025-07-26

## 2025-07-26 RX ORDER — FENTANYL CITRATE 50 UG/ML
25 INJECTION, SOLUTION INTRAMUSCULAR; INTRAVENOUS EVERY 5 MIN PRN
Status: DISCONTINUED | OUTPATIENT
Start: 2025-07-26 | End: 2025-07-26 | Stop reason: HOSPADM

## 2025-07-26 RX ORDER — FENTANYL CITRATE 50 UG/ML
50 INJECTION, SOLUTION INTRAMUSCULAR; INTRAVENOUS EVERY 5 MIN PRN
Status: DISCONTINUED | OUTPATIENT
Start: 2025-07-26 | End: 2025-07-26 | Stop reason: HOSPADM

## 2025-07-26 RX ORDER — SODIUM CHLORIDE, SODIUM LACTATE, POTASSIUM CHLORIDE, CALCIUM CHLORIDE 600; 310; 30; 20 MG/100ML; MG/100ML; MG/100ML; MG/100ML
INJECTION, SOLUTION INTRAVENOUS CONTINUOUS PRN
Status: DISCONTINUED | OUTPATIENT
Start: 2025-07-26 | End: 2025-07-26

## 2025-07-26 RX ORDER — HYDROMORPHONE HCL IN WATER/PF 6 MG/30 ML
0.2 PATIENT CONTROLLED ANALGESIA SYRINGE INTRAVENOUS EVERY 5 MIN PRN
Status: DISCONTINUED | OUTPATIENT
Start: 2025-07-26 | End: 2025-07-26 | Stop reason: HOSPADM

## 2025-07-26 RX ORDER — CEFAZOLIN SODIUM/WATER 2 G/20 ML
2 SYRINGE (ML) INTRAVENOUS
Status: DISCONTINUED | OUTPATIENT
Start: 2025-07-26 | End: 2025-07-26 | Stop reason: HOSPADM

## 2025-07-26 RX ORDER — CEFAZOLIN SODIUM/WATER 2 G/20 ML
SYRINGE (ML) INTRAVENOUS PRN
Status: DISCONTINUED | OUTPATIENT
Start: 2025-07-26 | End: 2025-07-26

## 2025-07-26 RX ORDER — IOPAMIDOL 755 MG/ML
103 INJECTION, SOLUTION INTRAVASCULAR ONCE
Status: COMPLETED | OUTPATIENT
Start: 2025-07-26 | End: 2025-07-26

## 2025-07-26 RX ORDER — SODIUM CHLORIDE, SODIUM LACTATE, POTASSIUM CHLORIDE, CALCIUM CHLORIDE 600; 310; 30; 20 MG/100ML; MG/100ML; MG/100ML; MG/100ML
INJECTION, SOLUTION INTRAVENOUS CONTINUOUS
Status: DISCONTINUED | OUTPATIENT
Start: 2025-07-26 | End: 2025-07-26 | Stop reason: HOSPADM

## 2025-07-26 RX ADMIN — PROPOFOL 200 MG: 10 INJECTION, EMULSION INTRAVENOUS at 17:00

## 2025-07-26 RX ADMIN — SODIUM CHLORIDE, SODIUM LACTATE, POTASSIUM CHLORIDE, AND CALCIUM CHLORIDE: .6; .31; .03; .02 INJECTION, SOLUTION INTRAVENOUS at 16:50

## 2025-07-26 RX ADMIN — DEXAMETHASONE SODIUM PHOSPHATE 4 MG: 4 INJECTION, SOLUTION INTRA-ARTICULAR; INTRALESIONAL; INTRAMUSCULAR; INTRAVENOUS; SOFT TISSUE at 17:11

## 2025-07-26 RX ADMIN — Medication 2 G: at 16:50

## 2025-07-26 RX ADMIN — Medication 100 MG: at 17:00

## 2025-07-26 RX ADMIN — HYDROMORPHONE HYDROCHLORIDE 0.5 MG: 1 INJECTION, SOLUTION INTRAMUSCULAR; INTRAVENOUS; SUBCUTANEOUS at 16:12

## 2025-07-26 RX ADMIN — FENTANYL CITRATE 100 MCG: 50 INJECTION INTRAMUSCULAR; INTRAVENOUS at 16:55

## 2025-07-26 RX ADMIN — HYDROMORPHONE HYDROCHLORIDE 0.5 MG: 1 INJECTION, SOLUTION INTRAMUSCULAR; INTRAVENOUS; SUBCUTANEOUS at 14:49

## 2025-07-26 RX ADMIN — GLYCOPYRROLATE 0.2 MG: 0.2 INJECTION, SOLUTION INTRAMUSCULAR; INTRAVENOUS at 17:30

## 2025-07-26 RX ADMIN — IOPAMIDOL 103 ML: 755 INJECTION, SOLUTION INTRAVENOUS at 14:16

## 2025-07-26 RX ADMIN — KETOROLAC TROMETHAMINE 30 MG: 30 INJECTION, SOLUTION INTRAMUSCULAR at 17:46

## 2025-07-26 RX ADMIN — Medication 150 MG: at 17:46

## 2025-07-26 RX ADMIN — ONDANSETRON 4 MG: 2 INJECTION INTRAMUSCULAR; INTRAVENOUS at 17:14

## 2025-07-26 RX ADMIN — SODIUM CHLORIDE 66 ML: 9 INJECTION, SOLUTION INTRAVENOUS at 14:16

## 2025-07-26 RX ADMIN — DEXMEDETOMIDINE HYDROCHLORIDE 10 MCG: 100 INJECTION, SOLUTION INTRAVENOUS at 18:02

## 2025-07-26 RX ADMIN — DEXMEDETOMIDINE HYDROCHLORIDE 10 MCG: 100 INJECTION, SOLUTION INTRAVENOUS at 17:11

## 2025-07-26 RX ADMIN — Medication 5 MG: at 17:00

## 2025-07-26 RX ADMIN — PROPOFOL 100 MCG/KG/MIN: 10 INJECTION, EMULSION INTRAVENOUS at 16:58

## 2025-07-26 RX ADMIN — SODIUM CHLORIDE 1000 ML: 0.9 INJECTION, SOLUTION INTRAVENOUS at 14:04

## 2025-07-26 RX ADMIN — LIDOCAINE HYDROCHLORIDE 100 MG: 20 INJECTION, SOLUTION INFILTRATION; PERINEURAL at 17:00

## 2025-07-26 RX ADMIN — AMPICILLIN SODIUM AND SULBACTAM SODIUM 3 G: 2; 1 INJECTION, POWDER, FOR SOLUTION INTRAMUSCULAR; INTRAVENOUS at 15:41

## 2025-07-26 RX ADMIN — MIDAZOLAM 2 MG: 1 INJECTION INTRAMUSCULAR; INTRAVENOUS at 16:50

## 2025-07-26 RX ADMIN — Medication 45 MG: at 17:07

## 2025-07-26 RX ADMIN — Medication 20 MG: at 17:23

## 2025-07-26 ASSESSMENT — COLUMBIA-SUICIDE SEVERITY RATING SCALE - C-SSRS
2. HAVE YOU ACTUALLY HAD ANY THOUGHTS OF KILLING YOURSELF IN THE PAST MONTH?: NO
6. HAVE YOU EVER DONE ANYTHING, STARTED TO DO ANYTHING, OR PREPARED TO DO ANYTHING TO END YOUR LIFE?: NO
1. IN THE PAST MONTH, HAVE YOU WISHED YOU WERE DEAD OR WISHED YOU COULD GO TO SLEEP AND NOT WAKE UP?: NO

## 2025-07-26 ASSESSMENT — ACTIVITIES OF DAILY LIVING (ADL)
ADLS_ACUITY_SCORE: 41

## 2025-07-26 NOTE — DISCHARGE INSTRUCTIONS
Same Day Surgery Discharge Instructions  Special Precautions After Surgery - Adult    It is not unusual to feel lightheaded or faint, up to 24 hours after surgery or while taking pain medication.  If you have these symptoms; sit for a few minutes before standing and have someone assist you when getting up.  You should rest and relax for the next 24 hours and must have someone stay with you for at least 24 hours after your discharge.  DO NOT DRIVE any vehicle or operate mechanical equipment for 24 hours following the end of your surgery.  DO NOT DRIVE while taking narcotic pain medications that have been prescribed by your physician.  If you had a limb operated on, you must be able to use it fully to drive.  DO NOT drink alcoholic beverages for 24 hours following surgery or while taking prescription pain medication.  Drink clear liquids (apple juice, ginger ale, broth, 7-Up, etc.).  Progress to your regular diet as you feel able.  Any questions call your physician and do not make important decisions for 24 hours.    Nausea and Vomiting: Nausea and vomiting can occur any time after receiving anesthesia. If you experience nausea and vomiting we encourage you to move to a clear liquid diet and advance your diet as tolerated. If nausea and vomiting do not improve within 12 hours please call the surgeon or present to the Emergency department.     Break-through Bleeding: If your experience bleeding from your surgical site apply pressure and additional dressing per nurse instruction. For simple problems such as a saturated dressing, you may need to reinforce the dressing with more gauze and tape and put slight pressure on the site. If bleeding does not subside contact the surgeon or present to the Emergency Department.    Post-op Infection: If you develop a fever of 100.4 or greater, have pus like drainage, redness, swelling or severe pain at the surgical site not alleviated with pain medications; please  contact the surgeon or present to the Emergency Department.     Medications:  Acetaminophen (Tylenol):  Next dose: Start anytime.  Ibuprofen (Motrin, Advil):  Next dose: 11:30PM.  Oxycodone:  Next dose: Start anytime.  Follow the instructions on the bottle.  __________________________________________________________________________________________________________________________________  IMPORTANT NUMBERS:    [facility] Main Number:  [phone], [phone]  Pharmacy:  [phone]  Same Day Surgery:  [phone], for general post-op questions call Monday - Thursday until 8:30 p.m., Fridays until 6:00 p.m.   Mental Health Mobile Crisis line: [phone]                                                                      [facility], podiatry:  [phone]  [facility]:  [phone]     OB Clinic:  [phone]   General Surgery:  [phone]  Urology: [phone]  Specialty Access Center: [phone]

## 2025-07-26 NOTE — ED PROVIDER NOTES
BP (!) 145/97   Pulse 88   Temp 98.3  F (36.8  C) (Tympanic)   Resp 17   SpO2 97%     Moody Davila is a 45-year-old male presenting with complaints of RLQ pain that began yesterday.  More constant today.  Had BM this morning.  Denies nausea, vomiting, or fevers. Given patient's history, I recommended he/she be evaluated in the emergency department.  We discussed that he/she will likely require further testing and/or treatment beyond the capabilities of the current urgent care setting including labs and potential imaging.  Patient expresses understanding of this and agreement with the plan. They were transferred to ED waiting area in stable condition.        Maritza Zamora PA-C  07/26/25 8332

## 2025-07-26 NOTE — CONSULTS
[facility] Emergency Department Surgery Consult    Moody Davila MRN# [id number]   Age: 45 year old YOB: 1980     Date of Admission:  7/26/2025    Reason for consult: Appendicitis       Requesting physician: Dr. Carmen       Level of consult: Consult, follow and place orders           Impression and Plan:   Impression:   Acute appendicitis with localized peritonitis, without perforation, abscess, or gangrene [K35.30]        Plan:   Laparoscopic appendectomy.   The procedure, risks, benefits, and alternatives were discussed and the patient agrees to proceed.             Chief Complaint:   Abdominal pain, right lower quadrant     History is obtained from the patient         History of Present Illness:   This 45 year old male who woke up yesterday morning with the right lower quadrant pain.  The pain progressively worsened over the course of the day and the night.  It did not resolve by this morning so he came to the ER.  Subsequent CT in the ER was consistent with acute appendicitis which I am now asked to see him.  Denies any nausea vomiting fevers chills.  Denies any prior episodes.  Currently is resting comfortably in the stretcher complaining of right lower quadrant pain.           Past Medical History:   History reviewed. No pertinent past medical history.          Past Surgical History:     Past Surgical History:   Procedure Laterality Date    WISDOM TOOTH EXTRACTION               Social History:     Social History     Tobacco Use    Smoking status: Never    Smokeless tobacco: Never   Substance Use Topics    Alcohol use: Not on file             Family History:     Family History   Problem Relation Age of Onset    No Known Problems Mother     No Known Problems Father     Mental Illness Brother         anxiety              Allergies:   No Known Allergies          Medications:     Current Facility-Administered Medications   Medication Dose Route Frequency Provider Last Rate Last Admin     ondansetron (ZOFRAN) injection 4 mg  4 mg Intravenous Once Srinivas Carmen MD         Current Outpatient Medications   Medication Sig Dispense Refill    sertraline (ZOLOFT) 100 MG tablet Take 2 tablets (200 mg) by mouth every morning. 180 tablet 3             Review of Systems:   The review of systems was positive for the following findings.  None.  The remainder of the review of systems was unremarkable.          Physical Exam:   PE:  B/P: 143/92, T: 98, P: 72, R: 18  General: well developed, well nourished WM who appears their stated age  HEENT: NC/AT, EOMI, (-)icterus, (-)injection  Neck: Supple, No JVD  Chest: CTA  Heart: S1, S2, (-)m/r/g  Abd: Soft, right lower quadrant tenderness with guarding, positive Rovsing sign, non distended,  no masses  Rectal: No masses  Ext; Warm, no edema  Psych: AAOx3  Neuro: No focal deficits            Data:   All laboratory data reviewed  Recent Results (from the past 24 hours)   CBC with Platelets and Differential (Limited Occurrences)    Narrative    The following orders were created for panel order CBC with Platelets and Differential (Limited Occurrences).  Procedure                               Abnormality         Status                     ---------                               -----------         ------                     CBC with platelets and ...[[id number]]                      Final result                 Please view results for these tests on the individual orders.   Comprehensive Metabolic Panel (Limited Occurrences)   Result Value Ref Range    Sodium 138 135 - 145 mmol/L    Potassium 3.7 3.4 - 5.3 mmol/L    Carbon Dioxide (CO2) 22 22 - 29 mmol/L    Anion Gap 12 7 - 15 mmol/L    Urea Nitrogen 9.3 6.0 - 20.0 mg/dL    Creatinine 0.94 0.67 - 1.17 mg/dL    GFR Estimate >90 >60 mL/min/1.73m2    Calcium 9.2 8.8 - 10.4 mg/dL    Chloride 104 98 - 107 mmol/L    Glucose 106 (H) 70 - 99 mg/dL    Alkaline Phosphatase 77 40 - 150 U/L    AST 15 0 - 45 U/L    ALT 20 0 - 70 U/L     Protein Total 7.4 6.4 - 8.3 g/dL    Albumin 4.5 3.5 - 5.2 g/dL    Bilirubin Total 0.6 <=1.2 mg/dL   CBC with platelets and differential   Result Value Ref Range    WBC Count 8.1 4.0 - 11.0 10e3/uL    RBC Count 4.82 4.40 - 5.90 10e6/uL    Hemoglobin 15.7 13.3 - 17.7 g/dL    Hematocrit 44.3 40.0 - 53.0 %    MCV 92 78 - 100 fL    MCH 32.6 26.5 - 33.0 pg    MCHC 35.4 31.5 - 36.5 g/dL    RDW 12.3 10.0 - 15.0 %    Platelet Count 166 150 - 450 10e3/uL    % Neutrophils 81 %    % Lymphocytes 11 %    % Monocytes 7 %    % Eosinophils 1 %    % Basophils 0 %    % Immature Granulocytes 0 %    NRBCs per 100 WBC 0 <1 /100    Absolute Neutrophils 6.5 1.6 - 8.3 10e3/uL    Absolute Lymphocytes 0.9 0.8 - 5.3 10e3/uL    Absolute Monocytes 0.6 0.0 - 1.3 10e3/uL    Absolute Eosinophils 0.1 0.0 - 0.7 10e3/uL    Absolute Basophils 0.0 0.0 - 0.2 10e3/uL    Absolute Immature Granulocytes 0.0 <=0.4 10e3/uL    Absolute NRBCs 0.0 10e3/uL   CT Abdomen Pelvis w Contrast    Narrative    EXAM: CT ABDOMEN PELVIS W CONTRAST  LOCATION: [facility]  DATE: 7/26/2025    INDICATION: Right lower quadrant abdominal pain  COMPARISON: None.  TECHNIQUE: CT scan of the abdomen and pelvis was performed following injection of IV contrast. Multiplanar reformats were obtained. Dose reduction techniques were used.  CONTRAST: 103 mL Isovue 370    FINDINGS:   LOWER CHEST: Mild coronary artery calcifications.    HEPATOBILIARY: Indeterminate 1.1 x 1.0 x 0.9 cm hypoattenuating liver observation in liver segment 8 image 162 series 5. Normal gallbladder and bile ducts.    PANCREAS: Normal.    SPLEEN: Normal.    ADRENAL GLANDS: Normal.    KIDNEYS/BLADDER: Small exophytic lower left renal cyst. No follow-up is indicated. No hydronephrosis. Mild likely chronic bladder wall thickening.    BOWEL: Abnormal dilated appendix measuring up to 15 mm in diameter. Multiple appendicoliths and mild periappendiceal edema. No abscess or free air. No bowel  obstruction.    LYMPH NODES: Normal.    VASCULATURE: Normal.    PELVIC ORGANS: Normal pelvic organs. Trace pelvic free fluid.    MUSCULOSKELETAL: Normal.      Impression    IMPRESSION:   1.  Mild acute uncomplicated appendicitis. No abscess or free air.  2.  Indeterminant 1.1 cm segment 8 central right hepatic lobe lesion, possibly a hemangioma. Recommend nonemergent follow-up with liver ultrasound.     All imaging studies reviewed by me.     Olvin Olivarez MD, FACS

## 2025-07-26 NOTE — MEDICATION SCRIBE - ADMISSION MEDICATION HISTORY
Medication Scribe Admission Medication History    Admission medication history is complete. The information provided in this note is only as accurate as the sources available at the time of the update.    Information Source(s): Patient and CareEverywhere/SureScripts via in-person    Pertinent Information: PTA med list reviewed with patient in room and finished at desk.  He also took 2 tablets of Excedrin at 08:00 today.    Changes made to PTA medication list:  Added: None  Deleted: None  Changed: None    Allergies reviewed with patient and updates made in EHR: yes, no allergies.    Medication History Completed By: Meggan Meneses 7/26/2025 3:47 PM    PTA Med List   Medication Sig Last Dose/Taking    sertraline (ZOLOFT) 100 MG tablet Take 2 tablets (200 mg) by mouth every morning. 7/26/2025 Morning

## 2025-07-26 NOTE — ED NOTES
Pt states that the pain started yesterday. Nothing seems to make it better or worse. Had one episode of diarrhea yesterday, nothing today.

## 2025-07-26 NOTE — ANESTHESIA PROCEDURE NOTES
Airway       Patient location during procedure: OR       Procedure Start/Stop Times: 7/26/2025 5:02 PM and 7/26/2025 5:02 PM  Staff -        CRNA: Zack Ray APRN CRNA       Performed By: CRNA  Consent for Airway        Urgency: elective  Indications and Patient Condition       Indications for airway management: chun-procedural       Induction type:intravenous       Mask difficulty assessment: 2 - vent by mask + OA or adjuvant +/- NMBA (facial hair)    Final Airway Details       Final airway type: endotracheal airway       Successful airway: ETT - single  Endotracheal Airway Details        ETT size (mm): 8.0       Cuffed: yes       Cuff volume (mL): 7       Successful intubation technique: direct laryngoscopy       DL Blade Type: Saeed 2       Grade View of Cords: 1       Adjucts: stylet       Position: Right       Measured from: lips       Secured at (cm): 24       Bite block used: Oral Airway    Post intubation assessment        Placement verified by: capnometry, equal breath sounds and chest rise        Number of attempts at approach: 1       Number of other approaches attempted: 0       Secured with: plastic tape       Ease of procedure: easy       Dentition: Intact and Unchanged    Medication(s) Administered   Medication Administration Time: 7/26/2025 5:02 PM

## 2025-07-26 NOTE — ED PROVIDER NOTES
History     Chief Complaint   Patient presents with    Abdominal Pain     Rlq, loss of appetite, does not feel nausea      HPI  History per patient, review of [facility] EMR and Care Everywhere EMR.    Moody Davila is a 45 year old male who presents to the emergency department for right lower quadrant abdominal pain with insidious onset yesterday, now constant, severe and exacerbated by movement and changing position.  No fever, chills or vomiting.  Yesterday he had diarrhea/loose BM without evidence of GI bleeding, and then now feels constipated.  Decreased appetite and oral intake today.  No back or flank pain.  No UTI signs or symptoms or hematuria.  No prior abdominal surgeries.  Allergies:  No Known Allergies    Problem List:    Patient Active Problem List    Diagnosis Date Noted    Generalized Anxiety Disorder      Priority: Medium     Created by Conversion            Past Medical History:    History reviewed. No pertinent past medical history.    Past Surgical History:    Past Surgical History:   Procedure Laterality Date    WISDOM TOOTH EXTRACTION         Family History:    Family History   Problem Relation Age of Onset    No Known Problems Mother     No Known Problems Father     Mental Illness Brother         anxiety       Social History:  Marital Status:   [2]  Social History     Tobacco Use    Smoking status: Never    Smokeless tobacco: Never   Vaping Use    Vaping status: Never Used        Medications:    sertraline (ZOLOFT) 100 MG tablet        Review of Systems  As mentioned in the HPI, in addition focused review of systems was negative.    Physical Exam   BP: (!) 145/97  Pulse: 88  Temp: 98.3  F (36.8  C)  Resp: 17  Weight: 95.3 kg (210 lb)  SpO2: 97 %      Physical Exam  Vitals and nursing note reviewed.   Constitutional:       General: He is not in acute distress.     Appearance: Normal appearance. He is well-developed. He is not ill-appearing, toxic-appearing or diaphoretic.   HENT:       Head: Normocephalic and atraumatic.      Right Ear: External ear normal.      Left Ear: External ear normal.      Nose: Nose normal.      Mouth/Throat:      Mouth: Mucous membranes are moist.   Eyes:      General: No scleral icterus.     Extraocular Movements: Extraocular movements intact.      Conjunctiva/sclera: Conjunctivae normal.   Neck:      Trachea: No tracheal deviation.   Cardiovascular:      Rate and Rhythm: Normal rate and regular rhythm.      Heart sounds: Normal heart sounds. No murmur heard.     No friction rub. No gallop.   Pulmonary:      Effort: Pulmonary effort is normal. No respiratory distress.      Breath sounds: Normal breath sounds. No wheezing, rhonchi or rales.   Abdominal:      General: Bowel sounds are decreased. There is no distension or abdominal bruit.      Palpations: Abdomen is soft. There is no mass or pulsatile mass.      Tenderness: There is abdominal tenderness. There is rebound. There is no guarding.      Hernia: No hernia is present.       Musculoskeletal:         General: No tenderness. Normal range of motion.      Cervical back: Normal range of motion and neck supple.      Right lower leg: No edema.      Left lower leg: No edema.   Skin:     General: Skin is warm and dry.      Coloration: Skin is not pale.      Findings: No erythema or rash.   Neurological:      General: No focal deficit present.      Mental Status: He is alert and oriented to person, place, and time.      Coordination: Coordination normal.   Psychiatric:         Mood and Affect: Mood normal.         Behavior: Behavior normal.         ED Course        Procedures                Recent Results (from the past 24 hours)   CBC with Platelets and Differential (Limited Occurrences)    Narrative    The following orders were created for panel order CBC with Platelets and Differential (Limited Occurrences).  Procedure                               Abnormality         Status                     ---------                                -----------         ------                     CBC with platelets and ...[[id number]]                      Final result                 Please view results for these tests on the individual orders.   Comprehensive Metabolic Panel (Limited Occurrences)   Result Value Ref Range    Sodium 138 135 - 145 mmol/L    Potassium 3.7 3.4 - 5.3 mmol/L    Carbon Dioxide (CO2) 22 22 - 29 mmol/L    Anion Gap 12 7 - 15 mmol/L    Urea Nitrogen 9.3 6.0 - 20.0 mg/dL    Creatinine 0.94 0.67 - 1.17 mg/dL    GFR Estimate >90 >60 mL/min/1.73m2    Calcium 9.2 8.8 - 10.4 mg/dL    Chloride 104 98 - 107 mmol/L    Glucose 106 (H) 70 - 99 mg/dL    Alkaline Phosphatase 77 40 - 150 U/L    AST 15 0 - 45 U/L    ALT 20 0 - 70 U/L    Protein Total 7.4 6.4 - 8.3 g/dL    Albumin 4.5 3.5 - 5.2 g/dL    Bilirubin Total 0.6 <=1.2 mg/dL   CBC with platelets and differential   Result Value Ref Range    WBC Count 8.1 4.0 - 11.0 10e3/uL    RBC Count 4.82 4.40 - 5.90 10e6/uL    Hemoglobin 15.7 13.3 - 17.7 g/dL    Hematocrit 44.3 40.0 - 53.0 %    MCV 92 78 - 100 fL    MCH 32.6 26.5 - 33.0 pg    MCHC 35.4 31.5 - 36.5 g/dL    RDW 12.3 10.0 - 15.0 %    Platelet Count 166 150 - 450 10e3/uL    % Neutrophils 81 %    % Lymphocytes 11 %    % Monocytes 7 %    % Eosinophils 1 %    % Basophils 0 %    % Immature Granulocytes 0 %    NRBCs per 100 WBC 0 <1 /100    Absolute Neutrophils 6.5 1.6 - 8.3 10e3/uL    Absolute Lymphocytes 0.9 0.8 - 5.3 10e3/uL    Absolute Monocytes 0.6 0.0 - 1.3 10e3/uL    Absolute Eosinophils 0.1 0.0 - 0.7 10e3/uL    Absolute Basophils 0.0 0.0 - 0.2 10e3/uL    Absolute Immature Granulocytes 0.0 <=0.4 10e3/uL    Absolute NRBCs 0.0 10e3/uL   CT Abdomen Pelvis w Contrast    Narrative    EXAM: CT ABDOMEN PELVIS W CONTRAST  LOCATION: [facility]  DATE: 7/26/2025    INDICATION: Right lower quadrant abdominal pain  COMPARISON: None.  TECHNIQUE: CT scan of the abdomen and pelvis was performed following injection of IV  contrast. Multiplanar reformats were obtained. Dose reduction techniques were used.  CONTRAST: 103 mL Isovue 370    FINDINGS:   LOWER CHEST: Mild coronary artery calcifications.    HEPATOBILIARY: Indeterminate 1.1 x 1.0 x 0.9 cm hypoattenuating liver observation in liver segment 8 image 162 series 5. Normal gallbladder and bile ducts.    PANCREAS: Normal.    SPLEEN: Normal.    ADRENAL GLANDS: Normal.    KIDNEYS/BLADDER: Small exophytic lower left renal cyst. No follow-up is indicated. No hydronephrosis. Mild likely chronic bladder wall thickening.    BOWEL: Abnormal dilated appendix measuring up to 15 mm in diameter. Multiple appendicoliths and mild periappendiceal edema. No abscess or free air. No bowel obstruction.    LYMPH NODES: Normal.    VASCULATURE: Normal.    PELVIC ORGANS: Normal pelvic organs. Trace pelvic free fluid.    MUSCULOSKELETAL: Normal.      Impression    IMPRESSION:   1.  Mild acute uncomplicated appendicitis. No abscess or free air.  2.  Indeterminant 1.1 cm segment 8 central right hepatic lobe lesion, possibly a hemangioma. Recommend nonemergent follow-up with liver ultrasound.       Medications   ondansetron (ZOFRAN) injection 4 mg (has no administration in time range)   HYDROmorphone (PF) (DILAUDID) injection 0.5 mg (has no administration in time range)   sodium chloride 0.9% BOLUS 1,000 mL (0 mLs Intravenous Stopped 7/26/25 1541)   HYDROmorphone (PF) (DILAUDID) injection 0.5 mg (0.5 mg Intravenous $Given 7/26/25 1449)   iopamidol (ISOVUE-370) solution 103 mL (103 mLs Intravenous $Given 7/26/25 1416)   sodium chloride 0.9 % bag for CT scan flush (66 mLs As instructed $Given 7/26/25 1416)   ampicillin-sulbactam (UNASYN) 3 g vial to attach to  mL bag (3 g Intravenous $New Bag 7/26/25 1541)     I reviewed the results of the patient laboratory evaluation and CT imaging evaluation with patient.  We discussed the incidental finding of an indeterminate right hepatic lobe lesion which needs  further follow-up as an outpatient.  He and his wife expressed understanding of need to follow-up with primary care physician to facilitate this.    3:26 PM - I reviewed the case with and consulted the Surgeon on-call, Dr. Olivarez.      Assessments & Plan (with Medical Decision Making)   Mild acute uncomplicated appendicitis. No abscess or free air.  Surgeon on-call was consulted and will take patient to the OR for laparoscopic appendectomy.  CT also showed an indeterminant 1.1 cm central right hepatic lobe lesion, possibly a hemangioma. A nonemergent liver ultrasound evaluation was recommended.  I reviewed the results of the CT with he and his wife and they understand the need for primary care clinic follow-up of his incidentally found indeterminate liver lesion, to arrange for liver ultrasound evaluation and follow-up with the results of this.  I reviewed the printed CT results with Moody and his wife and provided them with a copy of the results to facilitate his follow-up.    I have reviewed the nursing notes.    I have reviewed the findings, diagnosis, plan and need for follow up with the patient and his wife.        New Prescriptions    No medications on file       Final diagnoses:   Acute appendicitis with localized peritonitis, without perforation, abscess, or gangrene   Liver lesion -  Indeterminant 1.1 cm segment 8 central right hepatic lobe lesion       7/26/2025   [facility] EMERGENCY DEPT       Srinivas Carmen MD  07/26/25 3815

## 2025-07-26 NOTE — OP NOTE
[facility] Brief Operative Note    Pre-operative diagnosis: Acute appendicitis with localized peritonitis, without perforation, abscess, or gangrene [K35.30]   Post-operative diagnosis: Same  Bilateral inguinal hernias right larger than left   Procedure: Procedure(s):  APPENDECTOMY, LAPAROSCOPIC   Surgeon: Olvin Olivarez MD, FACS   Assistant(s): JONNIE Morgan.   assist was needed for positioning/prepping, camera management, retraction, and suturing.   Anesthesia: General endotracheal anesthesia   Estimated blood loss: Less than 10 ml   Total IV fluids: (See anesthesia record)   Blood transfusion: No transfusion was given during surgery   Total urine output: (See anesthesia record)   Drains: None   Specimens: Appendix   Implants: None   Findings: Dilated appendix with exudate.  No perforation.  Bilateral inguinal hernias right larger than left.   Complications: None   Condition: Stable   Comments: Indications for procedure: This a 45-year-old gentleman who presented with a day history of right lower quadrant pain.  Subsequent CT was consistent with acute appendicitis.  For this reason elected For laparoscopic appendectomy.       Details procedure:  Patient was taken the op room placed the table in supine position.  After induction general anesthesia the abdomen was prepped and draped sterile fashion.  A timeout is performed confirm the identity the patient as well as the procedure be performed.  A supraumbilical incision made subcutaneous tissue was bluntly opened using S retractors the fascia was grasped and a small opening was made using 11 blade.  A 12 mm Visiport was inserted the abdomen direct visualization.  A dilated appendix was readily seen in the right lower quadrant.  His nones of any injury from insertion of the Visiport.  A left lower quadrant 5 mm trocar followed by a 5 mm suprapubic trocar placed.  The the appendix was grasped pulled towards the anterior abdominal wall.  There was an  adherent loop of bowel and this was gently peeled away with a ConMed dissector.  The appendiceal mesentery was then taken down using cautery.  This was done back to the base of the appendix.  The appendiceal artery was also cauterized during this dissection.  An Endo SRIDHAR stapler was then fired across the base of the appendix.  The appendix was removed via an Endo Catch bag.  The areas then copiously irrigated all fluid was suctioned out.  The staple lines and mesentery were inspected without evidence of any bleeding.  The supraumbilical fascia was then closed in a: PMI needle #1 PDS.  All skin incision was then closed using 3-0 Vicryl and Dermabond glue.  Patient was taken from the operative to recovery in stable condition to be sent home.    Olvin Olivarez MD, FACS

## 2025-07-26 NOTE — ANESTHESIA PREPROCEDURE EVALUATION
Anesthesia Pre-Procedure Evaluation    Patient: Moody Davila   MRN: [id number] : 1980          Procedure : Procedure(s):  APPENDECTOMY, LAPAROSCOPIC         History reviewed. No pertinent past medical history.   Past Surgical History:   Procedure Laterality Date    WISDOM TOOTH EXTRACTION        No Known Allergies   Social History     Tobacco Use    Smoking status: Never    Smokeless tobacco: Never   Substance Use Topics    Alcohol use: Not on file      Wt Readings from Last 1 Encounters:   25 95.3 kg (210 lb)        Anesthesia Evaluation   Pt has had prior anesthetic. Type: MAC.        ROS/MED HX  ENT/Pulmonary:  - neg pulmonary ROS     Neurologic:  - neg neurologic ROS     Cardiovascular:  - neg cardiovascular ROS     METS/Exercise Tolerance:     Hematologic:  - neg hematologic  ROS     Musculoskeletal:  - neg musculoskeletal ROS     GI/Hepatic:     (+)         appendicitis,           Renal/Genitourinary:  - neg Renal ROS     Endo:  - neg endo ROS     Psychiatric/Substance Use:     (+) psychiatric history anxiety       Infectious Disease:  - neg infectious disease ROS     Malignancy:  - neg malignancy ROS     Other:  - neg other ROS            Physical Exam  Airway  Mallampati: II  TM distance: >3 FB  Neck ROM: full  Mouth opening: >= 4 cm    Cardiovascular - normal exam  Rhythm: regular  Rate: normal rate     Dental   (+) Completely normal teeth      Pulmonary - normal examBreath sounds clear to auscultation        Neurological - normal exam  He appears awake, alert and oriented x3.    Other Findings       OUTSIDE LABS:  CBC:   Lab Results   Component Value Date    WBC 8.1 2025    HGB 15.7 2025    HCT 44.3 2025     2025     BMP:   Lab Results   Component Value Date     2025    POTASSIUM 3.7 2025    CHLORIDE 104 2025    CO2 22 2025    BUN 9.3 2025    CR 0.94 2025     (H) 2025     COAGS: No results found for:  "\"PTT\", \"INR\", \"FIBR\"  POC: No results found for: \"BGM\", \"HCG\", \"HCGS\"  HEPATIC:   Lab Results   Component Value Date    ALBUMIN 4.5 07/26/2025    PROTTOTAL 7.4 07/26/2025    ALT 20 07/26/2025    AST 15 07/26/2025    ALKPHOS 77 07/26/2025    BILITOTAL 0.6 07/26/2025     OTHER:   Lab Results   Component Value Date    BIPIN 9.2 07/26/2025       Anesthesia Plan    ASA Status:  2, emergent      NPO Status: NPO Appropriate   Anesthesia Type: General.  Airway: oral.  Induction: intravenous.  Maintenance: Balanced, TIVA.   Techniques and Equipment:       - Monitoring Plan: standard ASA monitoring, train of four monitoring     Consents    Anesthesia Plan(s) and associated risks, benefits, and realistic alternatives discussed. Questions answered and patient/representative(s) expressed understanding.     - Discussed:     - Discussed with:  Patient        - Pt is DNR/DNI Status: no DNR          Postoperative Care    Pain management: plan for postoperative opioid use, multimodal analgesia.     Comments:                   BERNICE Hernandez CRNA    I have reviewed the pertinent notes and labs in the chart from the past 30 days and (re)examined the patient.  Any updates or changes from those notes are reflected in this note.    Clinically Significant Risk Factors Present on Admission                                              "

## 2025-07-26 NOTE — ED TRIAGE NOTES
Pt started having right side abdominal pain, lower quadrant since the morning and gradually pain worsened and pt is concerned for appendicitis. Pt has all of his abdominal organs     Triage Assessment (Adult)       Row Name 07/26/25 132          Triage Assessment    Airway WDL WDL        Respiratory WDL    Respiratory WDL WDL        Skin Circulation/Temperature WDL    Skin Circulation/Temperature WDL WDL        Cardiac WDL    Cardiac WDL WDL        Peripheral/Neurovascular WDL    Peripheral Neurovascular WDL WDL        Cognitive/Neuro/Behavioral WDL    Cognitive/Neuro/Behavioral WDL WDL        Reader Coma Scale    Best Eye Response 4-->(E4) spontaneous     Best Motor Response 6-->(M6) obeys commands     Best Verbal Response 5-->(V5) oriented     Reader Coma Scale Score 15

## 2025-07-27 NOTE — ANESTHESIA CARE TRANSFER NOTE
Patient: Moody Davila    Procedure: Procedure(s):  APPENDECTOMY, LAPAROSCOPIC       Diagnosis: Acute appendicitis with localized peritonitis, without perforation, abscess, or gangrene [K35.30]  Diagnosis Additional Information: No value filed.    Anesthesia Type:   General     Note:    Oropharynx: oropharynx clear of all foreign objects  Level of Consciousness: awake  Oxygen Supplementation: room air    Independent Airway: airway patency satisfactory and stable  Dentition: dentition unchanged  Vital Signs Stable: post-procedure vital signs reviewed and stable  Report to RN Given: handoff report given  Patient transferred to: PACU    Handoff Report: Identifed the Patient, Identified the Reponsible Provider, Reviewed the pertinent medical history, Discussed the surgical course, Reviewed Intra-OP anesthesia mangement and issues during anesthesia, Set expectations for post-procedure period and Allowed opportunity for questions and acknowledgement of understanding      Vitals:  Vitals Value Taken Time   /84 07/26/25 18:15   Temp 36.7  C (98  F) 07/26/25 18:15   Pulse 72 07/26/25 18:15   Resp     SpO2 97 % 07/26/25 18:15       Electronically Signed By: BERNICE Hernandez CRNA  July 26, 2025  7:40 PM

## 2025-07-27 NOTE — ANESTHESIA POSTPROCEDURE EVALUATION
Patient: Moody Davila    Procedure: Procedure(s):  APPENDECTOMY, LAPAROSCOPIC       Anesthesia Type:  General    Note:  Disposition: Outpatient   Postop Pain Control: Uneventful            Sign Out: Well controlled pain   PONV: No   Neuro/Psych: Uneventful            Sign Out: Acceptable/Baseline neuro status   Airway/Respiratory: Uneventful            Sign Out: Acceptable/Baseline resp. status   CV/Hemodynamics: Uneventful            Sign Out: Acceptable CV status; No obvious hypovolemia; No obvious fluid overload   Other NRE: NONE   DID A NON-ROUTINE EVENT OCCUR? No           Last vitals:  Vitals Value Taken Time   /84 07/26/25 18:15   Temp 36.7  C (98  F) 07/26/25 18:15   Pulse 72 07/26/25 18:15   Resp     SpO2 97 % 07/26/25 18:15       Electronically Signed By: BERNICE Hernandez CRNA  July 26, 2025  7:41 PM

## 2025-07-28 ENCOUNTER — MYC REFILL (OUTPATIENT)
Dept: OTHER | Age: 45
End: 2025-07-28

## 2025-07-28 DIAGNOSIS — G89.18 POST-OP PAIN: Primary | ICD-10-CM

## 2025-07-28 DIAGNOSIS — G89.18 POST-OP PAIN: ICD-10-CM

## 2025-07-28 RX ORDER — OXYCODONE HYDROCHLORIDE 5 MG/1
5-10 TABLET ORAL EVERY 6 HOURS PRN
Qty: 10 TABLET | Refills: 0 | Status: CANCELLED | OUTPATIENT
Start: 2025-07-28

## 2025-07-28 RX ORDER — OXYCODONE HYDROCHLORIDE 5 MG/1
5 TABLET ORAL EVERY 6 HOURS PRN
Qty: 10 TABLET | Refills: 0 | Status: SHIPPED | OUTPATIENT
Start: 2025-07-28 | End: 2025-07-31

## 2025-07-29 ENCOUNTER — MYC REFILL (OUTPATIENT)
Dept: SURGERY | Facility: CLINIC | Age: 45
End: 2025-07-29
Payer: COMMERCIAL

## 2025-07-29 DIAGNOSIS — G89.18 POST-OP PAIN: ICD-10-CM

## 2025-07-29 RX ORDER — OXYCODONE HYDROCHLORIDE 5 MG/1
5 TABLET ORAL EVERY 6 HOURS PRN
Qty: 10 TABLET | Refills: 0 | OUTPATIENT
Start: 2025-07-29

## 2025-07-30 ENCOUNTER — MYC MEDICAL ADVICE (OUTPATIENT)
Dept: SURGERY | Facility: CLINIC | Age: 45
End: 2025-07-30
Payer: COMMERCIAL

## 2025-07-30 NOTE — LETTER
[address] [address]  Phone: [phone]    July 31, 2025        Moody Davila  [address] [place] [facility] [place] [facility] [address]          To whom it may concern:    RE: Moody Davila    Patient may return to work 08/04/2025 with no restrictions.     Please contact me for questions or concerns.      Sincerely,

## 2025-07-31 LAB
PATH REPORT.COMMENTS IMP SPEC: NORMAL
PATH REPORT.COMMENTS IMP SPEC: NORMAL
PATH REPORT.FINAL DX SPEC: NORMAL
PATH REPORT.GROSS SPEC: NORMAL
PATH REPORT.MICROSCOPIC SPEC OTHER STN: NORMAL
PATH REPORT.RELEVANT HX SPEC: NORMAL
PHOTO IMAGE: NORMAL

## 2025-07-31 PROCEDURE — 88304 TISSUE EXAM BY PATHOLOGIST: CPT | Mod: 26 | Performed by: PATHOLOGY

## 2025-07-31 NOTE — TELEPHONE ENCOUNTER
Patient has an appt with you today at 9:15 am but the tissue appendix pathology is not resulted yet.  I talked to pathology yesterday and they said it won't be back until maybe Friday.  He is asking if he can go back to work on Monday and see you next week instead for his post op and pathology results. See Mychart below.

## 2025-08-04 ENCOUNTER — MYC MEDICAL ADVICE (OUTPATIENT)
Dept: SURGERY | Facility: CLINIC | Age: 45
End: 2025-08-04

## 2025-08-04 ENCOUNTER — OFFICE VISIT (OUTPATIENT)
Dept: SURGERY | Facility: CLINIC | Age: 45
End: 2025-08-04
Payer: COMMERCIAL

## 2025-08-04 VITALS
TEMPERATURE: 98.2 F | SYSTOLIC BLOOD PRESSURE: 124 MMHG | BODY MASS INDEX: 28.48 KG/M2 | DIASTOLIC BLOOD PRESSURE: 86 MMHG | HEART RATE: 73 BPM | WEIGHT: 210 LBS

## 2025-08-04 DIAGNOSIS — Z09 S/P APPENDECTOMY, FOLLOW-UP EXAM: Primary | ICD-10-CM

## 2025-08-04 PROCEDURE — 1126F AMNT PAIN NOTED NONE PRSNT: CPT | Performed by: STUDENT IN AN ORGANIZED HEALTH CARE EDUCATION/TRAINING PROGRAM

## 2025-08-04 PROCEDURE — 99024 POSTOP FOLLOW-UP VISIT: CPT | Performed by: STUDENT IN AN ORGANIZED HEALTH CARE EDUCATION/TRAINING PROGRAM

## 2025-08-04 PROCEDURE — 3074F SYST BP LT 130 MM HG: CPT | Performed by: STUDENT IN AN ORGANIZED HEALTH CARE EDUCATION/TRAINING PROGRAM

## 2025-08-04 PROCEDURE — 3079F DIAST BP 80-89 MM HG: CPT | Performed by: STUDENT IN AN ORGANIZED HEALTH CARE EDUCATION/TRAINING PROGRAM

## 2025-08-04 ASSESSMENT — PAIN SCALES - GENERAL: PAINLEVEL_OUTOF10: NO PAIN (0)

## 2025-08-27 ENCOUNTER — OFFICE VISIT (OUTPATIENT)
Dept: FAMILY MEDICINE | Facility: CLINIC | Age: 45
End: 2025-08-27
Payer: COMMERCIAL

## 2025-08-27 VITALS
DIASTOLIC BLOOD PRESSURE: 85 MMHG | OXYGEN SATURATION: 97 % | HEART RATE: 72 BPM | SYSTOLIC BLOOD PRESSURE: 124 MMHG | BODY MASS INDEX: 28.96 KG/M2 | HEIGHT: 72 IN | RESPIRATION RATE: 14 BRPM | WEIGHT: 213.8 LBS | TEMPERATURE: 97.3 F

## 2025-08-27 DIAGNOSIS — K76.9 LIVER LESION: ICD-10-CM

## 2025-08-27 DIAGNOSIS — N32.89 BLADDER WALL THICKENING: ICD-10-CM

## 2025-08-27 DIAGNOSIS — Z13.6 SCREENING FOR CARDIOVASCULAR CONDITION: Primary | ICD-10-CM

## 2025-08-27 DIAGNOSIS — Z87.19 HISTORY OF APPENDICITIS: ICD-10-CM

## 2025-08-27 PROCEDURE — 99214 OFFICE O/P EST MOD 30 MIN: CPT | Performed by: NURSE PRACTITIONER

## 2025-08-27 PROCEDURE — 1126F AMNT PAIN NOTED NONE PRSNT: CPT | Performed by: NURSE PRACTITIONER

## 2025-08-27 PROCEDURE — 3074F SYST BP LT 130 MM HG: CPT | Performed by: NURSE PRACTITIONER

## 2025-08-27 PROCEDURE — 3079F DIAST BP 80-89 MM HG: CPT | Performed by: NURSE PRACTITIONER

## 2025-08-27 ASSESSMENT — PAIN SCALES - GENERAL: PAINLEVEL_OUTOF10: NO PAIN (0)

## (undated) DEVICE — Device

## (undated) DEVICE — STPL ENDO RELOAD ECHELON 45X2.0MM GREY ECR45M

## (undated) DEVICE — ESU HOLSTER PLASTIC DISP E2400

## (undated) DEVICE — ESU CORD MONOPOLAR 10'  E0510

## (undated) DEVICE — ENDO TROCAR SLEEVE KII Z-THREADED 05X100MM CTS02

## (undated) DEVICE — PREP CHLORAPREP 26ML TINTED ORANGE  260815

## (undated) DEVICE — ESU ENDO SCISSORS 5MM CVD 5DCS

## (undated) DEVICE — SOLUTION IRRIGATION 0.9% NACL 1000ML BOTTLE R5200-01

## (undated) DEVICE — ENDO POUCH UNIV RETRIEVAL SYSTEM INZII 10MM CD001

## (undated) DEVICE — STPL POWERED ECHELON 45MM PSEE45A

## (undated) DEVICE — STRAP POSITIONING 60X31" BODY KNEE KBS 01

## (undated) DEVICE — ESU HOOK TIP 5MM CONMED

## (undated) DEVICE — GLOVE BIOGEL PI ULTRATOUCH G SZ 7.5 42175

## (undated) DEVICE — SU DERMABOND ADVANCED .7ML DNX12

## (undated) DEVICE — GOWN XLG DISP 9545

## (undated) DEVICE — ANTIFOG SOLUTION SEE SHARP 150M TROCAR SWABS 30978

## (undated) DEVICE — SU PDS II 0 CT-2 27" Z334H

## (undated) DEVICE — DECANTER VIAL 2006S

## (undated) DEVICE — DRAPE TIBURON GENERAL ENDOSCOPY 9458

## (undated) DEVICE — ENDO TROCAR FIRST ENTRY KII FIOS ADV FIX 12X100MM CFF73

## (undated) DEVICE — ENDO TROCAR FIRST ENTRY KII FIOS Z-THRD 05X100MM CTF03

## (undated) DEVICE — ESU SUCTION/IRRIGATION SYSTEM PISTOL GRIP

## (undated) DEVICE — STOCKING SLEEVE COMPRESSION CALF MED

## (undated) DEVICE — SU PDS II 1 CT-1 36" Z359T

## (undated) DEVICE — DEVICE SUTURE GRASPER TROCAR CLOSURE 14GAX15CM PMI-TC-SG

## (undated) DEVICE — SU VICRYL+ 3-0 27IN SH UND VCP416H

## (undated) RX ORDER — BUPIVACAINE HYDROCHLORIDE AND EPINEPHRINE 2.5; 5 MG/ML; UG/ML
INJECTION, SOLUTION EPIDURAL; INFILTRATION; INTRACAUDAL; PERINEURAL
Status: DISPENSED
Start: 2025-07-26

## (undated) RX ORDER — CEFAZOLIN SODIUM/WATER 2 G/20 ML
SYRINGE (ML) INTRAVENOUS
Status: DISPENSED
Start: 2025-07-26

## (undated) RX ORDER — GLYCOPYRROLATE 0.2 MG/ML
INJECTION, SOLUTION INTRAMUSCULAR; INTRAVENOUS
Status: DISPENSED
Start: 2025-07-26

## (undated) RX ORDER — DEXAMETHASONE SODIUM PHOSPHATE 4 MG/ML
INJECTION, SOLUTION INTRA-ARTICULAR; INTRALESIONAL; INTRAMUSCULAR; INTRAVENOUS; SOFT TISSUE
Status: DISPENSED
Start: 2025-07-26

## (undated) RX ORDER — DEXMEDETOMIDINE HYDROCHLORIDE 100 UG/ML
INJECTION, SOLUTION INTRAVENOUS
Status: DISPENSED
Start: 2025-07-26

## (undated) RX ORDER — FENTANYL CITRATE 50 UG/ML
INJECTION, SOLUTION INTRAMUSCULAR; INTRAVENOUS
Status: DISPENSED
Start: 2025-07-26

## (undated) RX ORDER — PROPOFOL 10 MG/ML
INJECTION, EMULSION INTRAVENOUS
Status: DISPENSED
Start: 2025-07-26

## (undated) RX ORDER — KETOROLAC TROMETHAMINE 30 MG/ML
INJECTION, SOLUTION INTRAMUSCULAR; INTRAVENOUS
Status: DISPENSED
Start: 2025-07-26

## (undated) RX ORDER — ONDANSETRON 2 MG/ML
INJECTION INTRAMUSCULAR; INTRAVENOUS
Status: DISPENSED
Start: 2025-07-26